# Patient Record
Sex: FEMALE | Race: WHITE | Employment: UNEMPLOYED | URBAN - METROPOLITAN AREA
[De-identification: names, ages, dates, MRNs, and addresses within clinical notes are randomized per-mention and may not be internally consistent; named-entity substitution may affect disease eponyms.]

---

## 2019-08-03 ENCOUNTER — HOSPITAL ENCOUNTER (EMERGENCY)
Facility: HOSPITAL | Age: 64
Discharge: HOME | End: 2019-08-03
Payer: COMMERCIAL

## 2019-08-03 ENCOUNTER — APPOINTMENT (OUTPATIENT)
Dept: GENERAL RADIOLOGY | Facility: HOSPITAL | Age: 64
End: 2019-08-03
Payer: COMMERCIAL

## 2019-08-03 VITALS
TEMPERATURE: 98 F | WEIGHT: 150 LBS | OXYGEN SATURATION: 100 % | DIASTOLIC BLOOD PRESSURE: 84 MMHG | HEART RATE: 60 BPM | SYSTOLIC BLOOD PRESSURE: 137 MMHG | RESPIRATION RATE: 18 BRPM

## 2019-08-03 DIAGNOSIS — S49.92XA INJURY OF LEFT SHOULDER, INITIAL ENCOUNTER: Primary | ICD-10-CM

## 2019-08-03 PROCEDURE — 73030 X-RAY EXAM OF SHOULDER: CPT

## 2019-08-03 PROCEDURE — 99283 EMERGENCY DEPT VISIT LOW MDM: CPT

## 2019-08-03 RX ORDER — IBUPROFEN 600 MG/1
600 TABLET ORAL EVERY 8 HOURS PRN
Qty: 30 TABLET | Refills: 0 | Status: SHIPPED | OUTPATIENT
Start: 2019-08-03 | End: 2019-08-10

## 2019-08-03 RX ORDER — LITHIUM CARBONATE 300 MG/1
300 CAPSULE ORAL DAILY
COMMUNITY

## 2019-08-03 NOTE — ED INITIAL ASSESSMENT (HPI)
Patient fell in her backyard while doing yard work. Patient tripped over gutter. No dizziness. Patient states she fell onto left side, presents with left shoulder pain. Patient stated when she got up her shoulder popped.  Patient able to move all of left ar

## 2019-08-03 NOTE — ED PROVIDER NOTES
Patient Seen in: [facility] AND [facility] Emergency Department    History   Patient presents with:  Upper Extremity Injury (musculoskeletal)    Stated Complaint: fall shoulder injury    64yo/f with no chronic medical problems reports to the ED with complaints o Neck: Normal range of motion. Neck supple. Cardiovascular: Normal rate, regular rhythm, normal heart sounds and intact distal pulses. Pulmonary/Chest: Effort normal and breath sounds normal.   Abdominal: Soft.  Bowel sounds are normal.   Musculoskelet Discharge Medication List    START taking these medications    ibuprofen 600 MG Oral Tab  Take 1 tablet (600 mg total) by mouth every 8 (eight) hours as needed for Pain or Fever.   Qty: 30 tablet Refills: 0

## 2025-03-21 ENCOUNTER — EKG ENCOUNTER (OUTPATIENT)
Dept: LAB | Facility: HOSPITAL | Age: 70
End: 2025-03-21
Attending: FAMILY MEDICINE
Payer: COMMERCIAL

## 2025-03-21 ENCOUNTER — OFFICE VISIT (OUTPATIENT)
Dept: FAMILY MEDICINE CLINIC | Facility: CLINIC | Age: 70
End: 2025-03-21
Payer: COMMERCIAL

## 2025-03-21 ENCOUNTER — TELEPHONE (OUTPATIENT)
Dept: FAMILY MEDICINE CLINIC | Facility: CLINIC | Age: 70
End: 2025-03-21

## 2025-03-21 VITALS
DIASTOLIC BLOOD PRESSURE: 84 MMHG | RESPIRATION RATE: 16 BRPM | HEIGHT: 62 IN | BODY MASS INDEX: 27.42 KG/M2 | WEIGHT: 149 LBS | HEART RATE: 58 BPM | OXYGEN SATURATION: 98 % | SYSTOLIC BLOOD PRESSURE: 134 MMHG

## 2025-03-21 DIAGNOSIS — Z87.898 HISTORY OF POSTOPERATIVE NAUSEA AND VOMITING: ICD-10-CM

## 2025-03-21 DIAGNOSIS — E78.2 MIXED HYPERLIPIDEMIA: ICD-10-CM

## 2025-03-21 DIAGNOSIS — Z01.818 PREOP EXAMINATION: ICD-10-CM

## 2025-03-21 DIAGNOSIS — M16.11 PRIMARY OSTEOARTHRITIS OF RIGHT HIP: Primary | ICD-10-CM

## 2025-03-21 DIAGNOSIS — M47.816 LUMBAR SPONDYLOSIS: ICD-10-CM

## 2025-03-21 DIAGNOSIS — N18.32 STAGE 3B CHRONIC KIDNEY DISEASE (HCC): ICD-10-CM

## 2025-03-21 DIAGNOSIS — Z01.812 PRE-OPERATIVE LABORATORY EXAMINATION: ICD-10-CM

## 2025-03-21 DIAGNOSIS — N32.81 OAB (OVERACTIVE BLADDER): ICD-10-CM

## 2025-03-21 DIAGNOSIS — F31.9 BIPOLAR 1 DISORDER (HCC): ICD-10-CM

## 2025-03-21 DIAGNOSIS — E27.8 ADRENAL CYST (HCC): ICD-10-CM

## 2025-03-21 DIAGNOSIS — N39.0 RECURRENT UTI: ICD-10-CM

## 2025-03-21 DIAGNOSIS — K21.00 GASTROESOPHAGEAL REFLUX DISEASE WITH ESOPHAGITIS WITHOUT HEMORRHAGE: ICD-10-CM

## 2025-03-21 LAB
ALBUMIN SERPL-MCNC: 4.4 G/DL (ref 3.2–4.8)
ALBUMIN/GLOB SERPL: 1.8 {RATIO} (ref 1–2)
ALP LIVER SERPL-CCNC: 50 U/L
ALT SERPL-CCNC: 16 U/L
ANION GAP SERPL CALC-SCNC: 6 MMOL/L (ref 0–18)
ANTIBODY SCREEN: NEGATIVE
AST SERPL-CCNC: 29 U/L (ref ?–34)
ATRIAL RATE: 50 BPM
BASOPHILS # BLD AUTO: 0.04 X10(3) UL (ref 0–0.2)
BASOPHILS NFR BLD AUTO: 0.5 %
BILIRUB SERPL-MCNC: 0.5 MG/DL (ref 0.2–1.1)
BILIRUB UR QL: NEGATIVE
BUN BLD-MCNC: 30 MG/DL (ref 9–23)
BUN/CREAT SERPL: 23.4 (ref 10–20)
CALCIUM BLD-MCNC: 9.5 MG/DL (ref 8.7–10.4)
CHLORIDE SERPL-SCNC: 106 MMOL/L (ref 98–112)
CLARITY UR: CLEAR
CO2 SERPL-SCNC: 28 MMOL/L (ref 21–32)
CREAT BLD-MCNC: 1.28 MG/DL
DEPRECATED RDW RBC AUTO: 47.9 FL (ref 35.1–46.3)
EGFRCR SERPLBLD CKD-EPI 2021: 45 ML/MIN/1.73M2 (ref 60–?)
EOSINOPHIL # BLD AUTO: 0.05 X10(3) UL (ref 0–0.7)
EOSINOPHIL NFR BLD AUTO: 0.6 %
ERYTHROCYTE [DISTWIDTH] IN BLOOD BY AUTOMATED COUNT: 13.9 % (ref 11–15)
FASTING STATUS PATIENT QL REPORTED: NO
GLOBULIN PLAS-MCNC: 2.5 G/DL (ref 2–3.5)
GLUCOSE BLD-MCNC: 70 MG/DL (ref 70–99)
GLUCOSE UR-MCNC: NORMAL MG/DL
HCT VFR BLD AUTO: 38.5 %
HGB BLD-MCNC: 12.9 G/DL
HGB UR QL STRIP.AUTO: NEGATIVE
IMM GRANULOCYTES # BLD AUTO: 0.02 X10(3) UL (ref 0–1)
IMM GRANULOCYTES NFR BLD: 0.2 %
KETONES UR-MCNC: NEGATIVE MG/DL
LEUKOCYTE ESTERASE UR QL STRIP.AUTO: 25
LYMPHOCYTES # BLD AUTO: 2.15 X10(3) UL (ref 1–4)
LYMPHOCYTES NFR BLD AUTO: 25.6 %
MCH RBC QN AUTO: 31.5 PG (ref 26–34)
MCHC RBC AUTO-ENTMCNC: 33.5 G/DL (ref 31–37)
MCV RBC AUTO: 94.1 FL
MONOCYTES # BLD AUTO: 0.75 X10(3) UL (ref 0.1–1)
MONOCYTES NFR BLD AUTO: 8.9 %
NEUTROPHILS # BLD AUTO: 5.38 X10 (3) UL (ref 1.5–7.7)
NEUTROPHILS # BLD AUTO: 5.38 X10(3) UL (ref 1.5–7.7)
NEUTROPHILS NFR BLD AUTO: 64.2 %
NITRITE UR QL STRIP.AUTO: NEGATIVE
OSMOLALITY SERPL CALC.SUM OF ELEC: 295 MOSM/KG (ref 275–295)
P AXIS: 38 DEGREES
P-R INTERVAL: 172 MS
PH UR: 7 [PH] (ref 5–8)
PLATELET # BLD AUTO: 236 10(3)UL (ref 150–450)
POTASSIUM SERPL-SCNC: 4.8 MMOL/L (ref 3.5–5.1)
PROT SERPL-MCNC: 6.9 G/DL (ref 5.7–8.2)
PROT UR-MCNC: NEGATIVE MG/DL
Q-T INTERVAL: 434 MS
QRS DURATION: 84 MS
QTC CALCULATION (BEZET): 395 MS
R AXIS: 6 DEGREES
RBC # BLD AUTO: 4.09 X10(6)UL
RH BLOOD TYPE: POSITIVE
SODIUM SERPL-SCNC: 140 MMOL/L (ref 136–145)
SP GR UR STRIP: 1.01 (ref 1–1.03)
T AXIS: 36 DEGREES
UROBILINOGEN UR STRIP-ACNC: NORMAL
VENTRICULAR RATE: 50 BPM
WBC # BLD AUTO: 8.4 X10(3) UL (ref 4–11)

## 2025-03-21 PROCEDURE — 93005 ELECTROCARDIOGRAM TRACING: CPT

## 2025-03-21 PROCEDURE — 87186 SC STD MICRODIL/AGAR DIL: CPT | Performed by: FAMILY MEDICINE

## 2025-03-21 PROCEDURE — 87081 CULTURE SCREEN ONLY: CPT | Performed by: FAMILY MEDICINE

## 2025-03-21 PROCEDURE — 81001 URINALYSIS AUTO W/SCOPE: CPT | Performed by: FAMILY MEDICINE

## 2025-03-21 PROCEDURE — 85025 COMPLETE CBC W/AUTO DIFF WBC: CPT | Performed by: FAMILY MEDICINE

## 2025-03-21 PROCEDURE — 80053 COMPREHEN METABOLIC PANEL: CPT | Performed by: FAMILY MEDICINE

## 2025-03-21 PROCEDURE — 87077 CULTURE AEROBIC IDENTIFY: CPT | Performed by: FAMILY MEDICINE

## 2025-03-21 PROCEDURE — 86901 BLOOD TYPING SEROLOGIC RH(D): CPT | Performed by: FAMILY MEDICINE

## 2025-03-21 PROCEDURE — 93010 ELECTROCARDIOGRAM REPORT: CPT | Performed by: INTERNAL MEDICINE

## 2025-03-21 PROCEDURE — 87086 URINE CULTURE/COLONY COUNT: CPT | Performed by: FAMILY MEDICINE

## 2025-03-21 PROCEDURE — 86850 RBC ANTIBODY SCREEN: CPT | Performed by: FAMILY MEDICINE

## 2025-03-21 PROCEDURE — 86900 BLOOD TYPING SEROLOGIC ABO: CPT | Performed by: FAMILY MEDICINE

## 2025-03-21 RX ORDER — MAGNESIUM AMINO ACID CHELATE 100 MG
TABLET ORAL DAILY
COMMUNITY

## 2025-03-21 RX ORDER — CELECOXIB 200 MG/1
200 CAPSULE ORAL DAILY
COMMUNITY

## 2025-03-21 RX ORDER — ESTRADIOL 10 UG/1
10 TABLET, FILM COATED VAGINAL
COMMUNITY

## 2025-03-21 RX ORDER — MELATONIN
1000 DAILY
COMMUNITY

## 2025-03-21 RX ORDER — NITROFURANTOIN 25; 75 MG/1; MG/1
100 CAPSULE ORAL ONCE AS NEEDED
COMMUNITY

## 2025-03-21 RX ORDER — ROSUVASTATIN CALCIUM 10 MG/1
10 TABLET, COATED ORAL NIGHTLY
COMMUNITY

## 2025-03-21 RX ORDER — ESTRADIOL 10 UG/1
TABLET, FILM COATED VAGINAL
COMMUNITY

## 2025-03-21 RX ORDER — ALPRAZOLAM 0.5 MG
0.25 TABLET ORAL NIGHTLY PRN
COMMUNITY

## 2025-03-21 NOTE — H&P (VIEW-ONLY)
[facility] PRE-OP CLINIC [place]    PRE-OP NOTE    HPI:   I have been consulted by Dr. Calloway to see Kerline Vizcaino 69 year old female for a preoperative evaluation and medical clearance. She  has a long history of worsening severe degenerative arthritis of her right hip . Patient is to have a right total hip arthroplasty  by Dr. Calloway  on 4/8/2025.     Pt suffers significant pain and loss of function.     She has no cardiopulmonary symptoms.      She has no history of MELVIN or DVT. Denies tobacco use.       Family History   Problem Relation Age of Onset    Other (aortic valve surgery) Mother     Bipolar Disorder Mother     Other (MI) Father     Other (osteoarthritis) Sister     Bipolar Disorder Maternal Grandmother       Current Outpatient Medications   Medication Sig Dispense Refill    rosuvastatin 10 MG Oral Tab Take 1 tablet (10 mg total) by mouth nightly.      Estradiol 10 MCG Vaginal Tab Place 10 mcg vaginally twice a week.      nitrofurantoin monohydrate macro (MACROBID) 100 MG Oral Cap Take 1 capsule (100 mg total) by mouth once as needed (as needed after intercorse).      celecoxib 200 MG Oral Cap Take 1 capsule (200 mg total) by mouth daily.      Glucosamine HCl-Glucosamin SO4 (GLUCOSAMINE COMPLEX OR) Take by mouth daily.      Estradiol (VAGIFEM) 10 MCG Vaginal Tab Place vaginally.      ALPRAZolam 0.5 MG Oral Tab Take 0.5 tablets (0.25 mg total) by mouth nightly as needed for Sleep.      Esomeprazole Magnesium 20 MG Oral Capsule Delayed Release Take 1 capsule (20 mg total) by mouth every morning before breakfast.      cyanocobalamin 1000 MCG Oral Tab Take 1 tablet (1,000 mcg total) by mouth daily.      Chelated Magnesium 100 MG Oral Tab Take by mouth daily.      Cholecalciferol (VITAMIN D3) 25 MCG (1000 UT) Oral Cap Take 1 tablet by mouth daily.      Lithium Carbonate 300 MG Oral Cap Take 1 capsule (300 mg total) by mouth daily.       Past Medical History:    Adrenal cyst (HCC)    Bipolar affective (HCC)     GERD (gastroesophageal reflux disease)    Hiatal hernia    7cm    HLD (hyperlipidemia)    Osteoarthritis    Osteopenia of lumbar spine    Overactive bladder    PONV (postoperative nausea and vomiting)    Stage 3 chronic kidney disease (HCC)    Visual impairment    reading and driving wears glasses    Zenker diverticulum     History reviewed. No pertinent surgical history.  Social History     Socioeconomic History    Marital status:      Spouse name: Not on file    Number of children: Not on file    Years of education: Not on file    Highest education level: Not on file   Occupational History    Not on file   Tobacco Use    Smoking status: Never     Passive exposure: Past    Smokeless tobacco: Never   Vaping Use    Vaping status: Never Used   Substance and Sexual Activity    Alcohol use: Yes     Comment: 3 drinks on weekends only    Drug use: Never    Sexual activity: Not on file   Other Topics Concern    Not on file   Social History Narrative    Not on file     Social Drivers of Health     Food Insecurity: No Food Insecurity (5/28/2024)    Received from [facility]    Hunger Vital Sign     Worried About Running Out of Food in the Last Year: Never true     Ran Out of Food in the Last Year: Never true   Transportation Needs: No Transportation Needs (5/28/2024)    Received from [facility]    PRAPARE - Transportation     Lack of Transportation (Medical): No     Lack of Transportation (Non-Medical): No   Stress: Not on file   Housing Stability: Low Risk  (5/28/2024)    Received from [facility]    Housing Stability Vital Sign     Unable to Pay for Housing in the Last Year: No     Number of Places Lived in the Last Year: 1     Unstable Housing in the Last Year: No       REVIEW OF SYSTEMS:   CONSTITUTIONAL:  Denies unusual weight gain/loss, fever, chills  EENT:  Eyes:  Denies eye pain, visual loss, blurred vision, double vision. Ears, Nose, Throat:   Denies congestion, runny nose or sore throat.  INTEGUMENTARY:  Denies rashes, itching, skin lesion,   CARDIOVASCULAR:  Denies DVT. Denies chest pain, palpitations, edema, dyspnea  RESPIRATORY: Denies  MELVIN ,Denies shortness of breath, wheezing, cough  GASTROINTESTINAL:  Denies abdominal pain, nausea, vomiting, constipation, diarrhea, or blood in stool.  MUSCULOSKELETAL: severe pain to her right hip as noted above  NEUROLOGICAL:  Denies headache, seizures, dizziness, syncope, paralysis, ataxia,  HEMATOLOGIC:  Denies anemia, bleeding or bruising.  LYMPHATICS:  Denies enlarged nodes   PSYCHIATRIC:  Denies depression or anxiety.  ENDOCRINOLOGIC: DM 2 NO,   ALLERGIES:  Denies allergic response, history of asthma, hives,     EXAM:   /84 (BP Location: Left arm)   Pulse 58   Resp 16   Ht 5' 2\" (1.575 m)   Wt 149 lb (67.6 kg)   SpO2 98%   BMI 27.25 kg/m²  Estimated body mass index is 27.25 kg/m² as calculated from the following:    Height as of this encounter: 5' 2\" (1.575 m).    Weight as of this encounter: 149 lb (67.6 kg).   Vital signs reviewed.Appears stated age, well groomed.  Physical Exam:  GEN:  Patient is alert, awake and oriented, well developed, well nourished, no apparent distress.  HEENT:  Head:  Normocephalic, atraumatic Nose: patent, no nasal discharge  NECK: Supple, no CLAD, no carotid bruit, no thyromegaly.  SKIN: No rashes, no skin lesion, no bruising, good turgor.  HEART:  Regular rate and rhythm, no murmurs, rubs or gallops.  LUNGS: Clear to auscultation bilterally, no rales/rhonchi/wheezing.  CHEST: No tenderness.  ABDOMEN:  Soft, nondistended, nontender,  no masses, no hepatosplenomegaly.  BACK: No tenderness, no spasm,   EXTREMITIES:  restricted ROM to her right hip,   NEURO:  No focal deficit, speech fluent, she walks with an antalgic  gait, strength and tone, sensory intact      No results found for: \"WBC\", \"HGB\", \"HCT\", \"PLT\", \"CREATSERUM\", \"BUN\", \"NA\", \"K\", \"CL\", \"CO2\", \"GLU\", \"CA\",  \"ALB\", \"ALKPHO\", \"BILT\", \"TP\", \"AST\", \"ALT\", \"PTT\", \"INR\", \"PT\", \"T4F\", \"TSH\", \"TSHREFLEX\", \"NEELAM\", \"LIP\", \"GGT\", \"PSA\", \"DDIMER\", \"ESRML\", \"ESRPF\", \"CRP\", \"BNP\", \"MG\", \"PHOS\", \"TROP\", \"CK\", \"CKMB\", \"MINDI\", \"RPR\", \"B12\", \"ETOH\", \"POCGLU\"    No results found.          ASSESSMENT AND PLAN:   Kerline Vizcaino is a 69 year old female, with a hx of severe degenerative arthritis of her right hip  who presents for a pre-operative physical exam. Patient is to have a right total hip arthroplasty  by Dr. Calloway  on 4/8/2025.      ICD-10-CM    1. Primary osteoarthritis of right hip  M16.11       2. Adrenal cyst ([facility])  E27.8       3. Bipolar 1 disorder ([facility])  F31.9       4. Gastroesophageal reflux disease with esophagitis without hemorrhage  K21.00       5. Recurrent UTI  N39.0       6. Mixed hyperlipidemia  E78.2       7. Lumbar spondylosis  M47.816       8. OAB (overactive bladder)  N32.81       9. History of postoperative nausea and vomiting  Z87.898       10. Stage 3b chronic kidney disease (HCC)  N18.32       11. Pre-operative laboratory examination  Z01.812 CBC With Differential With Platelet     Comp Metabolic Panel (14)     EKG 12 Lead     MSSA and MRSA Culture Screen     Type and screen     Urinalysis with Culture Reflex      12. Preop examination  Z01.818 CBC With Differential With Platelet     Comp Metabolic Panel (14)     EKG 12 Lead     MSSA and MRSA Culture Screen     Type and screen     Urinalysis with Culture Reflex           ECG and labs are pending review     Preoperative Risk Stratification: There are no decompensated medical conditions. ASA classification 2    Medical history:  High risk surgery (vascular, thoracic, intra-peritoneal): No  CAD: No  CHF: No  Stroke: No  DM on insulin: No  Serum Creatinine >2 mg/dl: No  Patient has an RCRI score that is intermediate risk and is at intermediate risk for major cardiac event in the perioperative period.     Patient is medically optimized and has an acceptable risk of  surgery and may proceed with surgery as planned.     PLAN:    Patient to discontinue medications and supplements with anticoagulation properties as per instruction.        Postoperative Recommendations:    Consider trial of postoperative pain management with non-narcotic analgesia if possible due to her adverse reactions to narcotics with her Psychiatric condition and interaction with Lithium.     Anticoagulation / DVT prophylaxis: SCDs, early ambulation.  mg twice daily with food for four weeks.    GI protection: Protonix  Incentive Spirometry   Telemetry as needed  CPAP/O2 as needed   DM: QID glucoscans and sliding scale insulin as needed   Renal protection: (hydration / NSAID and ACE/ARB avoidance)   Cognitive protection: (minimize narcotics, benzodiazepines, scopolamine)     Pain management and Physical therapy as per Orthopedic service.   Home Health as needed    Thank you for the opportunity to care for your patient and to assist in managing the postoperative course.        Casimiro Rueda DO  3/21/2025  10:26 AM

## 2025-03-21 NOTE — H&P
[facility] PRE-OP CLINIC [place]    PRE-OP NOTE    HPI:   I have been consulted by Dr. Calloway to see Kerline Vizcaino 69 year old female for a preoperative evaluation and medical clearance. She  has a long history of worsening severe degenerative arthritis of her right hip . Patient is to have a right total hip arthroplasty  by Dr. Calloway  on 4/8/2025.     Pt suffers significant pain and loss of function.     She has no cardiopulmonary symptoms.      She has no history of MELVIN or DVT. Denies tobacco use.       Family History   Problem Relation Age of Onset    Other (aortic valve surgery) Mother     Bipolar Disorder Mother     Other (MI) Father     Other (osteoarthritis) Sister     Bipolar Disorder Maternal Grandmother       Current Outpatient Medications   Medication Sig Dispense Refill    rosuvastatin 10 MG Oral Tab Take 1 tablet (10 mg total) by mouth nightly.      Estradiol 10 MCG Vaginal Tab Place 10 mcg vaginally twice a week.      nitrofurantoin monohydrate macro (MACROBID) 100 MG Oral Cap Take 1 capsule (100 mg total) by mouth once as needed (as needed after intercorse).      celecoxib 200 MG Oral Cap Take 1 capsule (200 mg total) by mouth daily.      Glucosamine HCl-Glucosamin SO4 (GLUCOSAMINE COMPLEX OR) Take by mouth daily.      Estradiol (VAGIFEM) 10 MCG Vaginal Tab Place vaginally.      ALPRAZolam 0.5 MG Oral Tab Take 0.5 tablets (0.25 mg total) by mouth nightly as needed for Sleep.      Esomeprazole Magnesium 20 MG Oral Capsule Delayed Release Take 1 capsule (20 mg total) by mouth every morning before breakfast.      cyanocobalamin 1000 MCG Oral Tab Take 1 tablet (1,000 mcg total) by mouth daily.      Chelated Magnesium 100 MG Oral Tab Take by mouth daily.      Cholecalciferol (VITAMIN D3) 25 MCG (1000 UT) Oral Cap Take 1 tablet by mouth daily.      Lithium Carbonate 300 MG Oral Cap Take 1 capsule (300 mg total) by mouth daily.       Past Medical History:    Adrenal cyst (HCC)    Bipolar affective (HCC)     GERD (gastroesophageal reflux disease)    Hiatal hernia    7cm    HLD (hyperlipidemia)    Osteoarthritis    Osteopenia of lumbar spine    Overactive bladder    PONV (postoperative nausea and vomiting)    Stage 3 chronic kidney disease (HCC)    Visual impairment    reading and driving wears glasses    Zenker diverticulum     History reviewed. No pertinent surgical history.  Social History     Socioeconomic History    Marital status:      Spouse name: Not on file    Number of children: Not on file    Years of education: Not on file    Highest education level: Not on file   Occupational History    Not on file   Tobacco Use    Smoking status: Never     Passive exposure: Past    Smokeless tobacco: Never   Vaping Use    Vaping status: Never Used   Substance and Sexual Activity    Alcohol use: Yes     Comment: 3 drinks on weekends only    Drug use: Never    Sexual activity: Not on file   Other Topics Concern    Not on file   Social History Narrative    Not on file     Social Drivers of Health     Food Insecurity: No Food Insecurity (5/28/2024)    Received from [facility]    Hunger Vital Sign     Worried About Running Out of Food in the Last Year: Never true     Ran Out of Food in the Last Year: Never true   Transportation Needs: No Transportation Needs (5/28/2024)    Received from [facility]    PRAPARE - Transportation     Lack of Transportation (Medical): No     Lack of Transportation (Non-Medical): No   Stress: Not on file   Housing Stability: Low Risk  (5/28/2024)    Received from [facility]    Housing Stability Vital Sign     Unable to Pay for Housing in the Last Year: No     Number of Places Lived in the Last Year: 1     Unstable Housing in the Last Year: No       REVIEW OF SYSTEMS:   CONSTITUTIONAL:  Denies unusual weight gain/loss, fever, chills  EENT:  Eyes:  Denies eye pain, visual loss, blurred vision, double vision. Ears, Nose, Throat:   Denies congestion, runny nose or sore throat.  INTEGUMENTARY:  Denies rashes, itching, skin lesion,   CARDIOVASCULAR:  Denies DVT. Denies chest pain, palpitations, edema, dyspnea  RESPIRATORY: Denies  MELVIN ,Denies shortness of breath, wheezing, cough  GASTROINTESTINAL:  Denies abdominal pain, nausea, vomiting, constipation, diarrhea, or blood in stool.  MUSCULOSKELETAL: severe pain to her right hip as noted above  NEUROLOGICAL:  Denies headache, seizures, dizziness, syncope, paralysis, ataxia,  HEMATOLOGIC:  Denies anemia, bleeding or bruising.  LYMPHATICS:  Denies enlarged nodes   PSYCHIATRIC:  Denies depression or anxiety.  ENDOCRINOLOGIC: DM 2 NO,   ALLERGIES:  Denies allergic response, history of asthma, hives,     EXAM:   /84 (BP Location: Left arm)   Pulse 58   Resp 16   Ht 5' 2\" (1.575 m)   Wt 149 lb (67.6 kg)   SpO2 98%   BMI 27.25 kg/m²  Estimated body mass index is 27.25 kg/m² as calculated from the following:    Height as of this encounter: 5' 2\" (1.575 m).    Weight as of this encounter: 149 lb (67.6 kg).   Vital signs reviewed.Appears stated age, well groomed.  Physical Exam:  GEN:  Patient is alert, awake and oriented, well developed, well nourished, no apparent distress.  HEENT:  Head:  Normocephalic, atraumatic Nose: patent, no nasal discharge  NECK: Supple, no CLAD, no carotid bruit, no thyromegaly.  SKIN: No rashes, no skin lesion, no bruising, good turgor.  HEART:  Regular rate and rhythm, no murmurs, rubs or gallops.  LUNGS: Clear to auscultation bilterally, no rales/rhonchi/wheezing.  CHEST: No tenderness.  ABDOMEN:  Soft, nondistended, nontender,  no masses, no hepatosplenomegaly.  BACK: No tenderness, no spasm,   EXTREMITIES:  restricted ROM to her right hip,   NEURO:  No focal deficit, speech fluent, she walks with an antalgic  gait, strength and tone, sensory intact      No results found for: \"WBC\", \"HGB\", \"HCT\", \"PLT\", \"CREATSERUM\", \"BUN\", \"NA\", \"K\", \"CL\", \"CO2\", \"GLU\", \"CA\",  \"ALB\", \"ALKPHO\", \"BILT\", \"TP\", \"AST\", \"ALT\", \"PTT\", \"INR\", \"PT\", \"T4F\", \"TSH\", \"TSHREFLEX\", \"NEELAM\", \"LIP\", \"GGT\", \"PSA\", \"DDIMER\", \"ESRML\", \"ESRPF\", \"CRP\", \"BNP\", \"MG\", \"PHOS\", \"TROP\", \"CK\", \"CKMB\", \"MINDI\", \"RPR\", \"B12\", \"ETOH\", \"POCGLU\"    No results found.          ASSESSMENT AND PLAN:   Kerline Vizcaino is a 69 year old female, with a hx of severe degenerative arthritis of her right hip  who presents for a pre-operative physical exam. Patient is to have a right total hip arthroplasty  by Dr. Calloway  on 4/8/2025.      ICD-10-CM    1. Primary osteoarthritis of right hip  M16.11       2. Adrenal cyst ([facility])  E27.8       3. Bipolar 1 disorder ([facility])  F31.9       4. Gastroesophageal reflux disease with esophagitis without hemorrhage  K21.00       5. Recurrent UTI  N39.0       6. Mixed hyperlipidemia  E78.2       7. Lumbar spondylosis  M47.816       8. OAB (overactive bladder)  N32.81       9. History of postoperative nausea and vomiting  Z87.898       10. Stage 3b chronic kidney disease (HCC)  N18.32       11. Pre-operative laboratory examination  Z01.812 CBC With Differential With Platelet     Comp Metabolic Panel (14)     EKG 12 Lead     MSSA and MRSA Culture Screen     Type and screen     Urinalysis with Culture Reflex      12. Preop examination  Z01.818 CBC With Differential With Platelet     Comp Metabolic Panel (14)     EKG 12 Lead     MSSA and MRSA Culture Screen     Type and screen     Urinalysis with Culture Reflex           ECG and labs are pending review     Preoperative Risk Stratification: There are no decompensated medical conditions. ASA classification 2    Medical history:  High risk surgery (vascular, thoracic, intra-peritoneal): No  CAD: No  CHF: No  Stroke: No  DM on insulin: No  Serum Creatinine >2 mg/dl: No  Patient has an RCRI score that is intermediate risk and is at intermediate risk for major cardiac event in the perioperative period.     Patient is medically optimized and has an acceptable risk of  surgery and may proceed with surgery as planned.     PLAN:    Patient to discontinue medications and supplements with anticoagulation properties as per instruction.        Postoperative Recommendations:    Consider trial of postoperative pain management with non-narcotic analgesia if possible due to her adverse reactions to narcotics with her Psychiatric condition and interaction with Lithium.     Anticoagulation / DVT prophylaxis: SCDs, early ambulation.  mg twice daily with food for four weeks.    GI protection: Protonix  Incentive Spirometry   Telemetry as needed  CPAP/O2 as needed   DM: QID glucoscans and sliding scale insulin as needed   Renal protection: (hydration / NSAID and ACE/ARB avoidance)   Cognitive protection: (minimize narcotics, benzodiazepines, scopolamine)     Pain management and Physical therapy as per Orthopedic service.   Home Health as needed    Thank you for the opportunity to care for your patient and to assist in managing the postoperative course.        Casimiro Rueda DO  3/21/2025  10:26 AM

## 2025-03-21 NOTE — TELEPHONE ENCOUNTER
RTHA on 04/08/25 with Dr. Calloway @ [facility]    H&P- completed 03/21/25  Labs- RDW-SD (47.9), BUN (30), Creatinine (1.28), BUN/Creat ratio (23.4), GFR (45), Alk Phos (50), UC 50-90,000 CFU/ML Klebsiella Pneumonia, MRSA neg, all other labs WNL  EKG- sinus bradycardia, otherwise WNL    Per NARENDRA-   Consider trial of postoperative pain management with non-narcotic analgesia if possible due to her adverse reactions to narcotics with her Psychiatric condition and interaction with Lithium.

## 2025-03-23 NOTE — TELEPHONE ENCOUNTER
Dr. Rueda, please review:    Labs- RDW-SD (47.9), BUN (30), Creatinine (1.28), BUN/Creat ratio (23.4), GFR (45), Alk Phos (50), UC 50-90,000 CFU/ML Klebsiella Pneumonia, MRSA neg, all other labs WNL    EKG- sinus bradycardia, otherwise WNL      URINE CULTURE 50,000-99,000 CFU/ML Klebsiella pneumoniae Abnormal         Resulting Agency: [facility] ([facility])     Susceptibility     Klebsiella pneumoniae     Not Specified    Ampicillin  Resistant    Ampicillin + Sulbactam 4 Sensitive    Cefazolin <=4 Sensitive    Ciprofloxacin <=0.25 Sensitive    Gentamicin <=1 Sensitive    Levofloxacin <=0.12 Sensitive    Meropenem <=0.25 Sensitive    Nitrofurantoin 128 Resistant    Piperacillin + Tazobactam <=4 Sensitive    Trimethoprim/Sulfa <=20 Sensitive                What antibiotic do you want to give? Only allergy is codeine.    OK for surgery?

## 2025-03-25 RX ORDER — CEPHALEXIN 500 MG/1
500 CAPSULE ORAL 3 TIMES DAILY
Qty: 15 CAPSULE | Refills: 0 | Status: SHIPPED | OUTPATIENT
Start: 2025-03-25 | End: 2025-03-30

## 2025-03-25 NOTE — TELEPHONE ENCOUNTER
Spoke to patient, went over all test results and let her know she is clear for surgery per Dr. Rueda. She will start Keflex 500mg TID x5 days. Rx sent to pharmacy.

## 2025-04-04 NOTE — CM/SW NOTE
SW was notified that the pt. Has been preoperatively arranged with [facility]. Referral to be sent post operatively via [facility].    Order and face to face to be sent via [facility] when entered by APN.      SARABJIT Rizzo ext [phone]

## 2025-04-07 RX ORDER — TRANEXAMIC ACID 10 MG/ML
1000 INJECTION, SOLUTION INTRAVENOUS ONCE
Status: CANCELLED | OUTPATIENT
Start: 2025-04-08 | End: 2025-04-08

## 2025-04-08 ENCOUNTER — APPOINTMENT (OUTPATIENT)
Dept: GENERAL RADIOLOGY | Facility: HOSPITAL | Age: 70
End: 2025-04-08
Attending: GENERAL PRACTICE
Payer: COMMERCIAL

## 2025-04-08 ENCOUNTER — ANESTHESIA EVENT (OUTPATIENT)
Dept: SURGERY | Facility: HOSPITAL | Age: 70
End: 2025-04-08
Payer: COMMERCIAL

## 2025-04-08 ENCOUNTER — HOSPITAL ENCOUNTER (INPATIENT)
Facility: HOSPITAL | Age: 70
LOS: 3 days | Discharge: HOME HEALTH | End: 2025-04-14
Attending: ORTHOPAEDIC SURGERY | Admitting: ORTHOPAEDIC SURGERY
Payer: COMMERCIAL

## 2025-04-08 ENCOUNTER — ANESTHESIA (OUTPATIENT)
Dept: SURGERY | Facility: HOSPITAL | Age: 70
End: 2025-04-08
Payer: COMMERCIAL

## 2025-04-08 DIAGNOSIS — Z96.641 PRESENCE OF ARTIFICIAL HIP, RIGHT: Primary | ICD-10-CM

## 2025-04-08 PROBLEM — E78.5 HYPERLIPIDEMIA: Status: ACTIVE | Noted: 2025-04-08

## 2025-04-08 PROBLEM — N18.30 CKD (CHRONIC KIDNEY DISEASE) STAGE 3, GFR 30-59 ML/MIN (HCC): Status: ACTIVE | Noted: 2025-04-08

## 2025-04-08 PROBLEM — M16.11 PRIMARY OSTEOARTHRITIS OF RIGHT HIP: Status: ACTIVE | Noted: 2025-04-08

## 2025-04-08 PROBLEM — F31.9 BIPOLAR AFFECTIVE DISORDER (HCC): Status: ACTIVE | Noted: 2025-04-08

## 2025-04-08 LAB — RH BLOOD TYPE: POSITIVE

## 2025-04-08 PROCEDURE — 0SR90JA REPLACEMENT OF RIGHT HIP JOINT WITH SYNTHETIC SUBSTITUTE, UNCEMENTED, OPEN APPROACH: ICD-10-PCS | Performed by: ORTHOPAEDIC SURGERY

## 2025-04-08 PROCEDURE — 99203 OFFICE O/P NEW LOW 30 MIN: CPT | Performed by: HOSPITALIST

## 2025-04-08 PROCEDURE — 73501 X-RAY EXAM HIP UNI 1 VIEW: CPT | Performed by: GENERAL PRACTICE

## 2025-04-08 DEVICE — IMPLANTABLE DEVICE
Type: IMPLANTABLE DEVICE | Site: HIP | Status: FUNCTIONAL
Brand: G7® VIVACIT-E®

## 2025-04-08 DEVICE — IMPLANTABLE DEVICE
Type: IMPLANTABLE DEVICE | Site: HIP | Status: FUNCTIONAL
Brand: AVENIR COMPLETE™

## 2025-04-08 DEVICE — IMPLANTABLE DEVICE: Type: IMPLANTABLE DEVICE | Site: HIP | Status: FUNCTIONAL

## 2025-04-08 DEVICE — BIOLOX® DELTA, CERAMIC FEMORAL HEAD, L, Ø 36/+3.5, TAPER 12/14
Type: IMPLANTABLE DEVICE | Site: HIP | Status: FUNCTIONAL
Brand: BIOLOX® DELTA

## 2025-04-08 DEVICE — IMPLANTABLE DEVICE
Type: IMPLANTABLE DEVICE | Site: HIP | Status: FUNCTIONAL
Brand: G7® ACETABULAR SYSTEM

## 2025-04-08 RX ORDER — SENNOSIDES 8.6 MG
325 CAPSULE ORAL 2 TIMES DAILY
Status: DISCONTINUED | OUTPATIENT
Start: 2025-04-08 | End: 2025-04-14

## 2025-04-08 RX ORDER — ACETAMINOPHEN 500 MG
1000 TABLET ORAL ONCE
Status: COMPLETED | OUTPATIENT
Start: 2025-04-08 | End: 2025-04-08

## 2025-04-08 RX ORDER — ROSUVASTATIN CALCIUM 10 MG/1
10 TABLET, COATED ORAL EVERY MORNING
Status: DISCONTINUED | OUTPATIENT
Start: 2025-04-09 | End: 2025-04-14

## 2025-04-08 RX ORDER — PANTOPRAZOLE SODIUM 40 MG/1
40 TABLET, DELAYED RELEASE ORAL
Status: DISCONTINUED | OUTPATIENT
Start: 2025-04-09 | End: 2025-04-14

## 2025-04-08 RX ORDER — LITHIUM CARBONATE 300 MG/1
300 TABLET, FILM COATED, EXTENDED RELEASE ORAL EVERY MORNING
Status: DISCONTINUED | OUTPATIENT
Start: 2025-04-09 | End: 2025-04-14

## 2025-04-08 RX ORDER — HYDROMORPHONE HYDROCHLORIDE 1 MG/ML
0.6 INJECTION, SOLUTION INTRAMUSCULAR; INTRAVENOUS; SUBCUTANEOUS EVERY 5 MIN PRN
Status: DISCONTINUED | OUTPATIENT
Start: 2025-04-08 | End: 2025-04-08 | Stop reason: HOSPADM

## 2025-04-08 RX ORDER — DOCUSATE SODIUM 100 MG/1
100 CAPSULE, LIQUID FILLED ORAL 2 TIMES DAILY
Status: DISCONTINUED | OUTPATIENT
Start: 2025-04-08 | End: 2025-04-14

## 2025-04-08 RX ORDER — TRANEXAMIC ACID 10 MG/ML
INJECTION, SOLUTION INTRAVENOUS AS NEEDED
Status: DISCONTINUED | OUTPATIENT
Start: 2025-04-08 | End: 2025-04-08 | Stop reason: SURG

## 2025-04-08 RX ORDER — TRANEXAMIC ACID 10 MG/ML
1000 INJECTION, SOLUTION INTRAVENOUS ONCE
Status: DISCONTINUED | OUTPATIENT
Start: 2025-04-08 | End: 2025-04-08

## 2025-04-08 RX ORDER — POLYETHYLENE GLYCOL 3350 17 G/17G
17 POWDER, FOR SOLUTION ORAL DAILY PRN
Status: DISCONTINUED | OUTPATIENT
Start: 2025-04-08 | End: 2025-04-14

## 2025-04-08 RX ORDER — HYDROMORPHONE HYDROCHLORIDE 1 MG/ML
0.4 INJECTION, SOLUTION INTRAMUSCULAR; INTRAVENOUS; SUBCUTANEOUS EVERY 2 HOUR PRN
Status: DISCONTINUED | OUTPATIENT
Start: 2025-04-08 | End: 2025-04-14

## 2025-04-08 RX ORDER — TRAMADOL HYDROCHLORIDE 50 MG/1
50 TABLET ORAL EVERY 6 HOURS SCHEDULED
Status: DISCONTINUED | OUTPATIENT
Start: 2025-04-08 | End: 2025-04-14

## 2025-04-08 RX ORDER — HYDROMORPHONE HYDROCHLORIDE 1 MG/ML
0.2 INJECTION, SOLUTION INTRAMUSCULAR; INTRAVENOUS; SUBCUTANEOUS EVERY 2 HOUR PRN
Status: DISCONTINUED | OUTPATIENT
Start: 2025-04-08 | End: 2025-04-14

## 2025-04-08 RX ORDER — CELECOXIB 200 MG/1
200 CAPSULE ORAL ONCE
Status: COMPLETED | OUTPATIENT
Start: 2025-04-08 | End: 2025-04-08

## 2025-04-08 RX ORDER — LIDOCAINE HYDROCHLORIDE 10 MG/ML
INJECTION, SOLUTION EPIDURAL; INFILTRATION; INTRACAUDAL; PERINEURAL AS NEEDED
Status: DISCONTINUED | OUTPATIENT
Start: 2025-04-08 | End: 2025-04-08 | Stop reason: SURG

## 2025-04-08 RX ORDER — DEXAMETHASONE SODIUM PHOSPHATE 4 MG/ML
VIAL (ML) INJECTION AS NEEDED
Status: DISCONTINUED | OUTPATIENT
Start: 2025-04-08 | End: 2025-04-08 | Stop reason: SURG

## 2025-04-08 RX ORDER — ALPRAZOLAM 0.25 MG
0.25 TABLET ORAL NIGHTLY PRN
Status: DISCONTINUED | OUTPATIENT
Start: 2025-04-08 | End: 2025-04-14

## 2025-04-08 RX ORDER — BISACODYL 10 MG
10 SUPPOSITORY, RECTAL RECTAL
Status: DISCONTINUED | OUTPATIENT
Start: 2025-04-08 | End: 2025-04-14

## 2025-04-08 RX ORDER — HYDROMORPHONE HYDROCHLORIDE 1 MG/ML
0.2 INJECTION, SOLUTION INTRAMUSCULAR; INTRAVENOUS; SUBCUTANEOUS EVERY 5 MIN PRN
Status: DISCONTINUED | OUTPATIENT
Start: 2025-04-08 | End: 2025-04-08 | Stop reason: HOSPADM

## 2025-04-08 RX ORDER — ONDANSETRON 2 MG/ML
4 INJECTION INTRAMUSCULAR; INTRAVENOUS EVERY 6 HOURS PRN
Status: DISCONTINUED | OUTPATIENT
Start: 2025-04-08 | End: 2025-04-14

## 2025-04-08 RX ORDER — ONDANSETRON 2 MG/ML
4 INJECTION INTRAMUSCULAR; INTRAVENOUS EVERY 6 HOURS PRN
Status: DISCONTINUED | OUTPATIENT
Start: 2025-04-08 | End: 2025-04-08

## 2025-04-08 RX ORDER — OXYCODONE HYDROCHLORIDE 5 MG/1
2.5 TABLET ORAL EVERY 4 HOURS PRN
Status: DISCONTINUED | OUTPATIENT
Start: 2025-04-08 | End: 2025-04-14

## 2025-04-08 RX ORDER — HYDROMORPHONE HYDROCHLORIDE 1 MG/ML
0.4 INJECTION, SOLUTION INTRAMUSCULAR; INTRAVENOUS; SUBCUTANEOUS EVERY 5 MIN PRN
Status: DISCONTINUED | OUTPATIENT
Start: 2025-04-08 | End: 2025-04-08 | Stop reason: HOSPADM

## 2025-04-08 RX ORDER — GLYCOPYRROLATE 0.2 MG/ML
INJECTION, SOLUTION INTRAMUSCULAR; INTRAVENOUS AS NEEDED
Status: DISCONTINUED | OUTPATIENT
Start: 2025-04-08 | End: 2025-04-08 | Stop reason: SURG

## 2025-04-08 RX ORDER — ONDANSETRON 2 MG/ML
INJECTION INTRAMUSCULAR; INTRAVENOUS AS NEEDED
Status: DISCONTINUED | OUTPATIENT
Start: 2025-04-08 | End: 2025-04-08 | Stop reason: SURG

## 2025-04-08 RX ORDER — DIPHENHYDRAMINE HCL 25 MG
25 CAPSULE ORAL EVERY 4 HOURS PRN
Status: DISCONTINUED | OUTPATIENT
Start: 2025-04-08 | End: 2025-04-14

## 2025-04-08 RX ORDER — DIPHENHYDRAMINE HYDROCHLORIDE 50 MG/ML
12.5 INJECTION, SOLUTION INTRAMUSCULAR; INTRAVENOUS EVERY 4 HOURS PRN
Status: DISCONTINUED | OUTPATIENT
Start: 2025-04-08 | End: 2025-04-14

## 2025-04-08 RX ORDER — LIDOCAINE HYDROCHLORIDE 10 MG/ML
INJECTION, SOLUTION INFILTRATION; PERINEURAL
Status: COMPLETED | OUTPATIENT
Start: 2025-04-08 | End: 2025-04-08

## 2025-04-08 RX ORDER — MIDAZOLAM HYDROCHLORIDE 1 MG/ML
INJECTION INTRAMUSCULAR; INTRAVENOUS AS NEEDED
Status: DISCONTINUED | OUTPATIENT
Start: 2025-04-08 | End: 2025-04-08 | Stop reason: SURG

## 2025-04-08 RX ORDER — SODIUM CHLORIDE, SODIUM LACTATE, POTASSIUM CHLORIDE, CALCIUM CHLORIDE 600; 310; 30; 20 MG/100ML; MG/100ML; MG/100ML; MG/100ML
INJECTION, SOLUTION INTRAVENOUS CONTINUOUS
Status: DISCONTINUED | OUTPATIENT
Start: 2025-04-08 | End: 2025-04-09

## 2025-04-08 RX ORDER — SODIUM CHLORIDE, SODIUM LACTATE, POTASSIUM CHLORIDE, CALCIUM CHLORIDE 600; 310; 30; 20 MG/100ML; MG/100ML; MG/100ML; MG/100ML
INJECTION, SOLUTION INTRAVENOUS CONTINUOUS
Status: DISCONTINUED | OUTPATIENT
Start: 2025-04-08 | End: 2025-04-08 | Stop reason: HOSPADM

## 2025-04-08 RX ORDER — HYDROMORPHONE HYDROCHLORIDE 1 MG/ML
INJECTION, SOLUTION INTRAMUSCULAR; INTRAVENOUS; SUBCUTANEOUS AS NEEDED
Status: DISCONTINUED | OUTPATIENT
Start: 2025-04-08 | End: 2025-04-08 | Stop reason: SURG

## 2025-04-08 RX ORDER — SENNOSIDES 8.6 MG
17.2 TABLET ORAL NIGHTLY
Status: DISCONTINUED | OUTPATIENT
Start: 2025-04-08 | End: 2025-04-14

## 2025-04-08 RX ORDER — OXYCODONE HYDROCHLORIDE 5 MG/1
5 TABLET ORAL EVERY 4 HOURS PRN
Status: DISCONTINUED | OUTPATIENT
Start: 2025-04-08 | End: 2025-04-14

## 2025-04-08 RX ORDER — OXYCODONE HCL 10 MG/1
10 TABLET, FILM COATED, EXTENDED RELEASE ORAL ONCE
Status: COMPLETED | OUTPATIENT
Start: 2025-04-08 | End: 2025-04-08

## 2025-04-08 RX ORDER — DIPHENHYDRAMINE HYDROCHLORIDE 50 MG/ML
25 INJECTION, SOLUTION INTRAMUSCULAR; INTRAVENOUS ONCE AS NEEDED
Status: ACTIVE | OUTPATIENT
Start: 2025-04-08 | End: 2025-04-08

## 2025-04-08 RX ORDER — SODIUM PHOSPHATE, DIBASIC AND SODIUM PHOSPHATE, MONOBASIC 7; 19 G/230ML; G/230ML
1 ENEMA RECTAL ONCE AS NEEDED
Status: DISCONTINUED | OUTPATIENT
Start: 2025-04-08 | End: 2025-04-14

## 2025-04-08 RX ORDER — NALOXONE HYDROCHLORIDE 0.4 MG/ML
0.08 INJECTION, SOLUTION INTRAMUSCULAR; INTRAVENOUS; SUBCUTANEOUS AS NEEDED
Status: DISCONTINUED | OUTPATIENT
Start: 2025-04-08 | End: 2025-04-08 | Stop reason: HOSPADM

## 2025-04-08 RX ORDER — METOCLOPRAMIDE HYDROCHLORIDE 5 MG/ML
10 INJECTION INTRAMUSCULAR; INTRAVENOUS EVERY 8 HOURS PRN
Status: DISCONTINUED | OUTPATIENT
Start: 2025-04-08 | End: 2025-04-14

## 2025-04-08 RX ORDER — BUPIVACAINE HYDROCHLORIDE 7.5 MG/ML
INJECTION, SOLUTION INTRASPINAL
Status: COMPLETED | OUTPATIENT
Start: 2025-04-08 | End: 2025-04-08

## 2025-04-08 RX ORDER — ACETAMINOPHEN 325 MG/1
650 TABLET ORAL
Status: DISCONTINUED | OUTPATIENT
Start: 2025-04-08 | End: 2025-04-14

## 2025-04-08 RX ADMIN — DEXAMETHASONE SODIUM PHOSPHATE 4 MG: 4 MG/ML VIAL (ML) INJECTION at 10:57:00

## 2025-04-08 RX ADMIN — HYDROMORPHONE HYDROCHLORIDE 0.5 MG: 1 INJECTION, SOLUTION INTRAMUSCULAR; INTRAVENOUS; SUBCUTANEOUS at 11:19:00

## 2025-04-08 RX ADMIN — TRANEXAMIC ACID 1000 MG: 10 INJECTION, SOLUTION INTRAVENOUS at 12:20:00

## 2025-04-08 RX ADMIN — BUPIVACAINE HYDROCHLORIDE 1.5 ML: 7.5 INJECTION, SOLUTION INTRASPINAL at 10:15:00

## 2025-04-08 RX ADMIN — TRANEXAMIC ACID 1000 MG: 10 INJECTION, SOLUTION INTRAVENOUS at 10:35:00

## 2025-04-08 RX ADMIN — ONDANSETRON 4 MG: 2 INJECTION INTRAMUSCULAR; INTRAVENOUS at 10:57:00

## 2025-04-08 RX ADMIN — LIDOCAINE HYDROCHLORIDE 50 MG: 10 INJECTION, SOLUTION EPIDURAL; INFILTRATION; INTRACAUDAL; PERINEURAL at 10:10:00

## 2025-04-08 RX ADMIN — GLYCOPYRROLATE 0.2 MG: 0.2 INJECTION, SOLUTION INTRAMUSCULAR; INTRAVENOUS at 10:24:00

## 2025-04-08 RX ADMIN — LIDOCAINE HYDROCHLORIDE 2 ML: 10 INJECTION, SOLUTION INFILTRATION; PERINEURAL at 10:15:00

## 2025-04-08 RX ADMIN — GLYCOPYRROLATE 0.2 MG: 0.2 INJECTION, SOLUTION INTRAMUSCULAR; INTRAVENOUS at 11:19:00

## 2025-04-08 RX ADMIN — SODIUM CHLORIDE, SODIUM LACTATE, POTASSIUM CHLORIDE, CALCIUM CHLORIDE: 600; 310; 30; 20 INJECTION, SOLUTION INTRAVENOUS at 12:25:00

## 2025-04-08 RX ADMIN — SODIUM CHLORIDE, SODIUM LACTATE, POTASSIUM CHLORIDE, CALCIUM CHLORIDE: 600; 310; 30; 20 INJECTION, SOLUTION INTRAVENOUS at 10:10:00

## 2025-04-08 RX ADMIN — MIDAZOLAM HYDROCHLORIDE 2 MG: 1 INJECTION INTRAMUSCULAR; INTRAVENOUS at 10:10:00

## 2025-04-08 NOTE — ANESTHESIA PROCEDURE NOTES
Airway  Date/Time: 4/8/2025 10:51 AM  Urgency: elective      General Information and Staff    Patient location during procedure: OR  Anesthesiologist: Fab Macias MD  Performed: anesthesiologist   Performed by: Fab Macias MD  Authorized by: Fab Macias MD      Indications and Patient Condition  Indications for airway management: anesthesia  Sedation level: deep  Preoxygenated: yes  Patient position: sniffing  Mask difficulty assessment: 1 - vent by mask    Final Airway Details  Final airway type: supraglottic airway      Successful airway: classic  Size 4       Number of attempts at approach: 1    Additional Comments  Atraumatic x1

## 2025-04-08 NOTE — ANESTHESIA POSTPROCEDURE EVALUATION
Patient: Kerline Vizcaino    Procedure Summary       Date: 04/08/25 Room / Location: [facility] MAIN OR  / [facility] MAIN OR    Anesthesia Start: 1010 Anesthesia Stop: 1242    Procedure: Right total hip arthroplasty (Right: Hip) Diagnosis: (Degenerative joint disease, right hip)    Surgeons: Rogelio Calloway MD Anesthesiologist: Fab Macias MD    Anesthesia Type: spinal ASA Status: 2            Anesthesia Type: spinal    Vitals Value Taken Time   /86 04/08/25 1242   Temp 97.5 04/08/25 1242   Pulse 76 04/08/25 1242   Resp 16 04/08/25 1242   SpO2 99 04/08/25 1242       [facility] AN Post Evaluation:   Patient Evaluated in PACU  Patient Participation: complete - patient participated  Level of Consciousness: awake and alert  Pain Management: adequate  Airway Patency:patent  Yes    Nausea/Vomiting: none  Cardiovascular Status: acceptable  Respiratory Status: acceptable and nasal cannula  Postoperative Hydration acceptable      Fab Macias MD  4/8/2025 12:42 PM

## 2025-04-08 NOTE — OPERATIVE REPORT
OPERATIVE REPORT     PROCEDURE DATE: 4/8/2025     SURGEON: Rogelio Calloway MD      ASSISTANT SURGEON: Valerio Rodriges MD  (No qualified resident was available to assist with this case; we will thus bill for fellow)    ASSISTANT: Marci surgical assist.     PREOPERATIVE DIAGNOSIS: Degenerative joint disease, right hip     POSTOPERATIVE DIAGNOSIS: Same    PROCEDURE: right total hip arthoplasty    ANESTHESIA: Spinal    PREOPERATIVE ANTIBIOTICS: 2Ancef 2 g IV within 60 minutes of procedure start.     ESTIMATED BLOOD LOSS: 200 mL.    ESTIMATED IV FLUIDS: see anesthesia record    ESTIMATED URINE OUTPUT: no john     IMPLANTS   1. Alisa G7 acetabular shell, multihole, 50 mm outer diameter, size D.   2. 6.5 mm bone screws x 2  3. Alisa G7 highly cross-linked polyethylene liner; neutral, 36 mm inner diameter, size D.   4. Alisa Avenir complete collarless stem, Coxa vara,  size 2.   5. Alisa Biolox Delta ceramic femoral head, 36 mm, +3.5, 12-14 taper.     SPECIMENS  Femoral head to pathology    COMPLICATIONS   None apparent.     FINDINGS   Consistent with end-stage hip degenerative joint disease.     INDICATIONS   Kerline Vizcaino is a 69 year old female who has been followed by the orthopedic surgery service for right hip degenerative joint disease. Kerline Vizcaino was previously seen and evaluated in the orthopedic clinic and diagnosed with hip degenerative joint disease. After nonoperative treatment measures such as physical therapy, activity modification, weight loss, and NSAIDs failed to improved the patient's symptoms, Kerline Vizcaino wished to proceed with surgery and was therefore scheduled for the above-described procedure on an elective basis.     TECHNIQUE   Kerline Vizcaino was identified and greeted in the preoperative holding area and was identified using medical record number, name, and date of birth, all of which were confirmed. The operative hip was marked using an indelible marker and informed consent was  verbally confirmed. The patient had previously signed a consent in clinic. Once again, all risks and benefits as well as alternatives to surgical intervention were discussed with the patient in detail and all the questions were answered. Risks discussed included but were not limited to pain, infection, bleeding, scarring, stiffness, thromboembolic events, severe limb dysfunction, loss of limb, and loss of life. The patient verbally confirmed the consent and wished to proceed with surgery as scheduled.     The anesthesia service then proceeded with anesthesia and Kerline Vizcaino was taken to the operating room and placed on the operating table in the lateral decubitus position. Care was taken to pad all bony prominences well. The patient was locked in the lateral decubitus position using the standard positioners. The lower extremity was then prepped and draped in a standard fashion. A preprocedural timeout was then performed once again confirming the patient's correct identity, correct procedure, correct side, and correct site. In addition, allergy status and required equipment were reviewed. Three independent members of the operating room team agreed on the above-mentioned parameters.     The ASIS was identified and approximately 2-3 cm posterior to it on the illiac crest a percutaneous stab incision was made and a mya pin was placed.  A second incision was also made and a second mya pin was placed.  The hip navigation [employer] platform was attached to the two pins.  Next, A standard posterior approach to the operative hip was then performed in the usual fashion. Once the gluteus mallory fascia was identified, it was split in line with its fibers under careful hemostasis using a Bovie. The tip of the trochanter was then identified and a standard arthrotomy was performed, traveling from quadratus femoris in line with the gluteus medius fibers proximally. The posterior hip capsule was tacked using #1  Ethibond suture.  Prior to hip dislocation, the intellijoint button was secured to the greater trochanter with 1 screw and the center of hip rotation was determined from the intellijoint system.  Next,  the hip was dislocated without complications. The saddle point of the femur and the lesser trochanter were directly palpated and visualized and freed of any interposed tissues. A standard neck cut was then performed according to preoperative templating using an oscillating saw and an osteotome.     The femur was then placed anteriorly to the acetabulum and retracted using an anterior retractor. A second retractor was placed on the posterior inferior aspect of the acetabulum. Next, any remaining labral remnants and scar tissue were carefully removed circumferentially from the acetabulum. The cotyloid fossa was cleared out of any soft tissues under careful hemostasis using a Bovie. Sequential reaming was then performed. Trialing was then performed which was found to fit the patient's anatomy well and abduction, anteversion and offset were all confirmed with the intellijoint system. Accordingly, an appropriately sized Alisa G7 acetabular component was then placed and impacted into place using gentle mallet blows. It was secured in place using two 6.5 mm bone screws. A poly trial was then placed.     Attention was then turned to the femur and box osteotome was used to lateralize and a canal finder was used to obtain the adequate trajectory. Sequential broaching was then performed using a Alisa M/L taper broaches. Trialing was then performed and the components were found to achieve excellent stability. Leg length was found to be equal. Accordingly, all trials and the femoral broach were removed.     The definitive Alisa G7 liner was placed and impacted using gentle mallet blows. Once its adequate position was confirmed, attention was turned back to the femur and the above documented Alisa Avenir complete was impacted  into the proximal femur. Care was taken to avoid any calcar fractures. Trialing was once again commenced and excellent stability was achieved. Accordingly, the head was exchanged for the Biolox ceramic head specifically documented above. The hip was once again reduced and final time taken through range of motion with adequate stability and offset, anteversion and abduction again confirmed using the [employer] navigation system.  The two steimann pinns as well as the trochanter button and screw were all removed and accounted for prior to closure.     The hip was then irrigated, and the posterior capsule was carefully closed. It was sutured into the gluteus medius tendon and its insertion at the greater trochanter. A tight capsular repair was achieved. The hip was then once again irrigated using normal saline and closed in a layered fashion. The gluteus mallory fascia was closed using #2 ethibond and #2 quil, and a local anesthetic cocktail was injected into the fascia and subcutaneous tissue circumferentially tamiko-incisionally. Subcutaneous closure was achieved using #0 vicryl and 2-0 vicryl, and the skin was closed using staples. Skin was dressed using a SILVERLON dressing. Pin site incisions were closed with 2-0 monocryl and dermabond and dressed with 4x4 and tegaderm.     The patient was then rolled back into the supine position, transferred onto a regular bed and brought to PACU in stable condition.At the end of the procedure, all sponge, needle, instrument, and scalpel counts were performed and found to be correct.     Attending physician Dr. Calloway was scrubbed and present for all key parts of the procedure. He supervised the entire procedure.       POSTOPERATIVE PLAN  -admit to orthopedics  -pain control  -DVT ppx: mechanical + 325mg PO aspirin BID  -Weight bearing as tolerated on the right lower extremity, posterior hip precautions  -PT on POD #0  -Medicine co-management        Valerio Rodriges MD  Fellow  - Adult Reconstruction  Department of Orthopaedic Surgery  4/8/2025  12:53 PM

## 2025-04-08 NOTE — CM/SW NOTE
Department  notified of request for HHC, aidin referrals started. Assigned CM/SW to follow up with pt/family on further discharge planning.     Marcy Roland, DSC

## 2025-04-08 NOTE — ANESTHESIA PREPROCEDURE EVALUATION
Anesthesia PreOp Note    HPI:     Kerline Vizcaino is a 69 year old female who presents for preoperative consultation requested by: Rogelio Calloway MD    Date of Surgery: 4/8/2025    Procedure(s):  Right total hip arthroplasty  Indication: Degenerative joint disease, right hip    Relevant Problems   No relevant active problems       NPO:                         History Review:  There are no active problems to display for this patient.      Past Medical History:    Adrenal cyst (HCC)    Bipolar affective (HCC)    GERD (gastroesophageal reflux disease)    Hiatal hernia    7cm    HLD (hyperlipidemia)    Osteoarthritis    Osteopenia of lumbar spine    Overactive bladder    PONV (postoperative nausea and vomiting)    Renal disorder    Kidney function acceted by Lithium medication    Stage 3 chronic kidney disease (HCC)    Visual impairment    reading and driving wears glasses    Zenker diverticulum       Past Surgical History:   Procedure Laterality Date    Colonoscopy      Upper gi endoscopy,exam         Prescriptions Prior to Admission[1]  Current Medications and Prescriptions Ordered in Epic[2]    Allergies[3]    Family History   Problem Relation Age of Onset    Other (aortic valve surgery) Mother     Bipolar Disorder Mother     Other (MI) Father     Other (osteoarthritis) Sister     Bipolar Disorder Maternal Grandmother      Social History     Socioeconomic History    Marital status:    Tobacco Use    Smoking status: Never     Passive exposure: Past    Smokeless tobacco: Never   Vaping Use    Vaping status: Never Used   Substance and Sexual Activity    Alcohol use: Yes     Comment: 3 drinks on weekends only    Drug use: Never       Available pre-op labs reviewed.  Lab Results   Component Value Date    WBC 8.4 03/21/2025    RBC 4.09 03/21/2025    HGB 12.9 03/21/2025    HCT 38.5 03/21/2025    MCV 94.1 03/21/2025    MCH 31.5 03/21/2025    MCHC 33.5 03/21/2025    RDW 13.9 03/21/2025    .0 03/21/2025      Lab Results   Component Value Date     03/21/2025    K 4.8 03/21/2025     03/21/2025    CO2 28.0 03/21/2025    BUN 30 (H) 03/21/2025    CREATSERUM 1.28 (H) 03/21/2025    GLU 70 03/21/2025    CA 9.5 03/21/2025          Vital Signs:  Body mass index is 27.07 kg/m².   height is 1.575 m (5' 2\") and weight is 67.1 kg (148 lb). Her oral temperature is 98.2 °F (36.8 °C). Her blood pressure is 139/82 and her pulse is 57. Her respiration is 18 and oxygen saturation is 99%.   Vitals:    04/04/25 1601 04/08/25 0807   BP:  139/82   Pulse:  57   Resp:  18   Temp:  98.2 °F (36.8 °C)   TempSrc:  Oral   SpO2:  99%   Weight: 67.6 kg (149 lb) 67.1 kg (148 lb)   Height: 1.727 m (5' 8\") 1.575 m (5' 2\")        Anesthesia Evaluation     Patient summary reviewed and Nursing notes reviewed    History of anesthetic complications   Airway   Mallampati: II  TM distance: >3 FB  Neck ROM: full  Dental    (+) implants    Pulmonary - negative ROS and normal exam   Cardiovascular - negative ROS and normal exam    ECG reviewed    Neuro/Psych    (+)  neuromuscular disease,  bipolar disorder      GI/Hepatic/Renal    (+) GERD, chronic renal disease CRI    Endo/Other - negative ROS   Abdominal                  Anesthesia Plan:   ASA:  2  Plan:   Spinal  Post-op Pain Management: IV analgesics  Plan Comments: , no blood thinners - proceed with plain spinal  Informed Consent Plan and Risks Discussed With:  Patient and spouse      I have informed Kerline Vizcaino and/or legal guardian or family member of the nature of the anesthetic plan, benefits, risks including possible dental damage if relevant, major complications, and any alternative forms of anesthetic management.   All of the patient's questions were answered to the best of my ability. The patient desires the anesthetic management as planned.  Fab Macias MD  4/8/2025 8:14 AM  Present on Admission:  **None**           [1]   Medications Prior to Admission   Medication  Sig Dispense Refill Last Dose/Taking    TURMERIC-GINGER OR Take 1 capsule by mouth daily.   Past Week    psyllium Oral Powd Pack Take 1 packet by mouth 2 (two) times daily as needed.   Past Week    rosuvastatin 10 MG Oral Tab Take 1 tablet (10 mg total) by mouth every morning.   4/8/2025 at  5:00 AM    Estradiol 10 MCG Vaginal Tab Place 10 mcg vaginally twice a week.   4/5/2025    nitrofurantoin monohydrate macro (MACROBID) 100 MG Oral Cap Take 1 capsule (100 mg total) by mouth once as needed (as needed after intercorse).   Past Week    celecoxib 200 MG Oral Cap Take 1 capsule (200 mg total) by mouth daily as needed.   3/30/2025    Glucosamine HCl-Glucosamin SO4 (GLUCOSAMINE COMPLEX OR) Take by mouth daily.   Past Week    ALPRAZolam 0.5 MG Oral Tab Take 0.5 tablets (0.25 mg total) by mouth nightly as needed for Sleep.   4/6/2025    Esomeprazole Magnesium 20 MG Oral Capsule Delayed Release Take 1 capsule (20 mg total) by mouth every morning before breakfast.   4/8/2025 at  5:00 AM    cyanocobalamin 1000 MCG Oral Tab Take 1 tablet (1,000 mcg total) by mouth daily.   Past Week    Chelated Magnesium 100 MG Oral Tab Take by mouth daily.   Past Week    Cholecalciferol (VITAMIN D3) 25 MCG (1000 UT) Oral Cap Take 1 tablet by mouth daily.   Past Week    Lithium Carbonate 300 MG Oral Cap Take 1 capsule (300 mg total) by mouth every morning.   4/8/2025 at  5:00 AM   [2]   Current Facility-Administered Medications Ordered in Epic   Medication Dose Route Frequency Provider Last Rate Last Admin    lactated ringers infusion   Intravenous Continuous Rogelio Calloway MD        acetaminophen (Tylenol Extra Strength) tab 1,000 mg  1,000 mg Oral Once Rogelio Calloway MD        celecoxib (CeleBREX) cap 200 mg  200 mg Oral Once Rogelio Calloway MD        oxyCODONE ER (OxyCONTIN ER) 12 hr tab 10 mg  10 mg Oral Once Rogelio Calloway MD        ceFAZolin (Ancef) 2g in 10mL IV syringe premix  2 g Intravenous Once Rogelio Calloway  MD ANEESH        clonidine-EPINEPHrine-ropivacaine-ketorolac (CERTS) (Duraclon-Adrenalin-Naropin-Toradol) pain cocktail irrigation   Intra-articular Once (Intra-Op) Rogelio Calloway MD         No current Epic-ordered outpatient medications on file.   [3]   Allergies  Allergen Reactions    Codeine OTHER (SEE COMMENTS)     Interacted with lithium at the time causing manic episode

## 2025-04-08 NOTE — INTERVAL H&P NOTE
Pre-op Diagnosis: Degenerative joint disease, right hip    The above referenced H&P was reviewed by Valerio Rodriges MD on 4/8/2025, the patient was examined and no significant changes have occurred in the patient's condition since the H&P was performed.  I discussed with the patient and/or legal representative the potential benefits, risks and side effects of this procedure; the likelihood of the patient achieving goals; and potential problems that might occur during recuperation.  I discussed reasonable alternatives to the procedure, including risks, benefits and side effects related to the alternatives and risks related to not receiving this procedure.  We will proceed with procedure as planned.

## 2025-04-08 NOTE — ANESTHESIA PROCEDURE NOTES
Spinal Block    Date/Time: 4/8/2025 10:15 AM    Performed by: Fab Macias MD  Authorized by: Fab Macias MD      General Information and Staff    Start Time:  4/8/2025 10:15 AM  End Time:  4/8/2025 10:19 AM  Anesthesiologist:  Fab Macias MD  Performed by:  Anesthesiologist  Patient Location:  OR  Site identification: surface landmarks    Reason for Block: at surgeon's request and surgical anesthesia    Preanesthetic Checklist: patient identified, IV checked, risks and benefits discussed, monitors and equipment checked, pre-op evaluation, timeout performed, anesthesia consent and sterile technique used      Procedure Details    Patient Position:  Sitting  Prep: ChloraPrep    Monitoring:  Cardiac monitor, heart rate and continuous pulse ox  Approach:  Midline  Location:  L3-4  Injection Technique:  Single-shot    Needle    Needle Type:  Sprotte  Needle Gauge:  24 G  Needle Length:  3.5 in    Assessment    Sensory Level:   Events: clear CSF, CSF aspirated, well tolerated and blood negative      Additional Comments     Scoliosis noted but more in thoracic spine, easy spinal with 25ga, CSF aspirated, slow injection of meds, positive aspiration at the end of injection

## 2025-04-08 NOTE — CM/SW NOTE
MDO received for post op. Per previous documentation, patient arranged pre op with [facility]. Referral sent in aidin and agency reserved.     Order and F2F to be sent via aidin when entered by VAL Snowden RN, BSN

## 2025-04-08 NOTE — CONSULTS
[facility]    PATIENT'S NAME: ANA SMITH   ATTENDING PHYSICIAN: Rogelio Calloway MD   CONSULTING PHYSICIAN: Martín Stafford MD   PATIENT ACCOUNT#:   [id number]    LOCATION:  [facility] PACU [address] [address]  MEDICAL RECORD #:   [id number]       YOB: 1955  ADMISSION DATE:       04/08/2025      CONSULT DATE:  04/08/2025    REPORT OF CONSULTATION      REASON FOR ADMISSION:  Post right total hip arthroplasty.    HISTORY OF PRESENT ILLNESS:  Patient is a 69-year-old  female with chronic right hip pain and underlying severe primary osteoarthritis, failed outpatient conservative medical management options.  Scheduled today for above-mentioned procedure by her orthopedic surgeon, Dr. Calloway.  Preoperatively, she had spinal block.  Postoperatively, transferred to PACU further monitoring.      PAST MEDICAL HISTORY:  Generalized osteoarthritis, bipolar affective disorder, hyperlipidemia, gastroesophageal reflux disease, chronic kidney disease stage 3.    PAST SURGICAL HISTORY:  None.    MEDICATIONS:  Please see medication reconciliation list.     ALLERGIES:  Side effects to codeine.  No known drug allergies.     SOCIAL HISTORY:  No tobacco or drug use.  Occasional alcohol.  Independent for basic activities of daily living.     FAMILY HISTORY:  Mother had valvular heart disease and bipolar affective disorder.  Father had coronary artery disease.    REVIEW OF SYSTEMS:  Currently resting in bed.  No right hip pain.  No chest pain.  No shortness of breath.  Other 12-point review of systems is negative.       PHYSICAL EXAMINATION:    GENERAL:  Alert and oriented to time, place and person.  No acute distress.   VITAL SIGNS:  Temperature 97.7, pulse 69, respiratory rate 14, blood pressure 158/93, pulse ox 100% on 3L nasal cannula oxygen.  HEENT:  Atraumatic.  Oropharynx clear.  Moist mucous membranes.  Ears and nose normal.  Eyes:  Anicteric sclerae.   NECK:  Supple.  No lymphadenopathy.  Trachea  midline.    LUNGS:  Clear to auscultation bilaterally.  Normal respiratory effort.   HEART:  Regular rate and rhythm.  S1 and S2 auscultated.  No murmur.    ABDOMEN:  Soft, nondistended.  No tenderness.  Positive bowel sounds.   EXTREMITIES:  Right hip dressing.  No leg edema.  No clubbing or cyanosis.   NEUROLOGIC:  Decreased sensation and muscle movement in both lower extremities, post spinal block.  No other focal findings.    ASSESSMENT AND PLAN:    1.   Right hip primary osteoarthritis, status post right total hip arthroplasty.  Spinal block.  Pain control.  Neurovascular checks.  Aspirin for DVT prophylaxis.  Physical and occupational therapy.   2.   Bipolar affective disorder.  Continue home medications.   3.   Hyperlipidemia.  Continue home medications.   4.   Chronic kidney disease stage 3.  Continue to monitor postoperatively.    Dictated By Martín Stafford MD  d: 04/08/2025 13:23:07  t: 04/08/2025 13:36:55  Job [id number]  FB/

## 2025-04-09 LAB
HCT VFR BLD AUTO: 32.2 % (ref 35–48)
HGB BLD-MCNC: 10.5 G/DL (ref 12–16)

## 2025-04-09 PROCEDURE — 99214 OFFICE O/P EST MOD 30 MIN: CPT | Performed by: HOSPITALIST

## 2025-04-09 RX ORDER — TRAMADOL HYDROCHLORIDE 50 MG/1
50 TABLET ORAL EVERY 6 HOURS SCHEDULED
Status: SHIPPED | COMMUNITY
Start: 2025-04-09

## 2025-04-09 RX ORDER — OXYCODONE HYDROCHLORIDE 5 MG/1
5 TABLET ORAL EVERY 4 HOURS PRN
Status: SHIPPED | COMMUNITY
Start: 2025-04-09

## 2025-04-09 RX ORDER — SENNOSIDES 8.6 MG
325 CAPSULE ORAL 2 TIMES DAILY
Status: SHIPPED | COMMUNITY
Start: 2025-04-09

## 2025-04-09 RX ORDER — ACETAMINOPHEN 325 MG/1
650 TABLET ORAL EVERY 8 HOURS SCHEDULED
Status: SHIPPED | COMMUNITY
Start: 2025-04-09

## 2025-04-09 RX ORDER — PANTOPRAZOLE SODIUM 40 MG/1
40 TABLET, DELAYED RELEASE ORAL
Status: SHIPPED | COMMUNITY
Start: 2025-04-10

## 2025-04-09 NOTE — DISCHARGE SUMMARY
Ortho Discharge Summary     Patient ID:  Kerline Vizcaino  [id number]  69 year old  12/23/1955      Admit Date: 4/8/2025    Discharge Date: ***    Attending Physician: Rogelio Calloway MD     Reason for admission: right hip DJD    Discharge Diagnoses: Degenerative joint disease, right hip  S/P hip replacement, right    Discharge Condition: good    History of Present Illness:  Kerline Vizcaino is a 69 year old female who has been followed by the orthopedic surgery service for right hip degenerative joint disease. Kerline Vizcaino was previously seen and evaluated in the orthopedic clinic and diagnosed with hip degenerative joint disease. After nonoperative treatment measures such as physical therapy, activity modification, weight loss, and NSAIDs failed to improved the patient's symptoms, Kerline Vizcaino wished to proceed with surgery and was therefore scheduled for the above-described procedure on an elective basis.      All risks, benefits and alternatives for total hip were discussed with the patient and informed consent was obtained for the surgical procedure. The risks discussed included but were not limited to: infection, nerve injury, arterial injury, stiffness, numbness, wear, lysis, need for re-operation, DVT, PE, loss of limb and loss of life. Medical clearance was obtained and there were no contraindications to move forward with surgery on 4/8/2025.      Hospital Course:  The patient was taken to the operating room on the day of admission for Procedure(s): right total hip arthroplasty. The patient tolerated the procedure well and was taken to the post operative room in a stable condition. The patient was noted to be neurovascularly intact post operatively. The post operative films show components in excellent condition.     The patient dressing was checked on post operative day 1 and found to be dry and intact. The gauze dressing was left in place. Occupational and physical therapy worked with the patient each day and  they performed well. The patient had adequate pain control with oral medication. They were given 24 hours of perioperative antibiotic prophylaxis. The patient was managed in conjunction with the medical team for general medical issues. The patient developed right lower leg weakness and numbness with concern for femoral neuropathy. Neurology was consulted, the patient had an MRI that showed no sign of lumbosacral plexus injury. Suspect for local nerve irritation. The patient had improving symptoms throughout her inpatient stay but was given a knee immobilizer for temporary support. The patient met physical therapy goals and was discharged.     Rogelio Calloway MD was aware of all events throughout the hospital course and supervised this patient's care. The discharge of this patient was coordinated with the discharge planner and was discharged on the medication as listed in medical reconciliation.    Consults: ANCILLARY CONSULT TO CASE MANAGEMENT, internal medicine, NEUROLOGY    Disposition: stable, discharge to home with home health       Discharge Medications        START taking these medications        Instructions Prescription details   acetaminophen 325 MG Tabs  Commonly known as: Tylenol      Take 2 tablets (650 mg total) by mouth every 8 (eight) hours.   Refills: 0     aspirin 325 MG Tbec      Take 1 tablet (325 mg total) by mouth in the morning and 1 tablet (325 mg total) before bedtime.   Refills: 0     Naloxone HCl 4 MG/0.1ML Liqd      4 mg by Nasal route as needed. If patient remains unresponsive, repeat dose in other nostril 2-5 minutes after first dose.   Quantity: 1 kit  Refills: 0     oxyCODONE 5 MG Tabs      Take 1 tablet (5 mg total) by mouth every 4 (four) hours as needed.   Refills: 0     pantoprazole 40 MG Tbec  Commonly known as: Protonix  Replaces: Esomeprazole Magnesium 20 MG Cpdr      Take 1 tablet (40 mg total) by mouth every morning before breakfast.   Refills: 0     Sennosides 17.2 MG  Tabs      Take 1 tablet (17.2 mg total) by mouth 2 (two) times daily.   Refills: 0     traMADol 50 MG Tabs  Commonly known as: Ultram      Take 1 tablet (50 mg total) by mouth every 6 (six) hours.   Refills: 0            CONTINUE taking these medications        Instructions Prescription details   celecoxib 200 MG Caps  Commonly known as: CeleBREX      Take 1 capsule (200 mg total) by mouth daily as needed.   Refills: 0            STOP taking these medications      Esomeprazole Magnesium 20 MG Cpdr  Commonly known as: NEXIUM  Replaced by: pantoprazole 40 MG Tbec               ASK your doctor about these medications        Instructions Prescription details   ALPRAZolam 0.5 MG Tabs  Commonly known as: Xanax      Take 0.5 tablets (0.25 mg total) by mouth nightly as needed for Sleep.   Refills: 0     Chelated Magnesium 100 MG Tabs      Take by mouth daily.   Refills: 0     cyanocobalamin 1000 MCG Tabs  Commonly known as: Vitamin B12      Take 1 tablet (1,000 mcg total) by mouth daily.   Refills: 0     Estradiol 10 MCG Tabs      Place 10 mcg vaginally twice a week.   Refills: 0     GLUCOSAMINE COMPLEX OR      Take by mouth daily.   Refills: 0     lithium carbonate 300 MG Caps      Take 1 capsule (300 mg total) by mouth every morning.   Refills: 0     Macrobid 100 MG Caps  Generic drug: nitrofurantoin monohydrate macro      Take 1 capsule (100 mg total) by mouth once as needed (as needed after intercorse).   Refills: 0     psyllium Pack  Commonly known as: Metamucil      Take 1 packet by mouth 2 (two) times daily as needed.   Refills: 0     rosuvastatin 10 MG Tabs  Commonly known as: Crestor      Take 1 tablet (10 mg total) by mouth every morning.   Refills: 0     TURMERIC-GINGER OR      Take 1 capsule by mouth daily.   Refills: 0     Vitamin D3 25 MCG (1000 UT) Caps      Take 1 tablet by mouth daily.   Refills: 0               Where to Get Your Medications        These medications were sent to [id number]  - [place], [place] - [address] AT [facility] [address], [phone], [phone]  [address], [facility] [address]      Phone: [phone]   Naloxone HCl 4 MG/0.1ML Liqd           Patient Instructions:     Activity: activity as tolerated and no driving for 2 weeks    Diet: regular     Wound Care: The incision is covered with a Silverlon (gauze) dressing. Please protect the incision with Saran wrap (brand name only) over the dressing and tape occlusively.  The Saran wrap is “water resistant” only.   DO NOT position yourself with the incisional area directly in the stream of water.  After shower, remove the Saran wrap and change your dressing. Change dressing daily.     Home Health Agency:  [facility]    RICK Muse  Nurse Practitioner for Dr. Rogelio Calloway  P: [phone]

## 2025-04-09 NOTE — PROGRESS NOTES
[facility]  part of [facility]    Progress Note    Kerline Vizcaino Patient Status:  Outpatient in a Bed    1955 MRN [id number]   Location [facility] Attending Messi Duggan MD   Hosp Day # 0 PCP GABBY GARCIA       Subjective:     Pt has numb area on medial right leg.   Pt with weakness of right leg and cannot stand on the leg.  Pt had numbness and weakness of right foot yesterday however this is resolved today.    Objective:   Blood pressure 134/76, pulse 60, temperature 98.2 °F (36.8 °C), resp. rate 16, height 62\", weight 148 lb (67.1 kg), SpO2 93%.    Gen:   NAD.  A and O x 3  CV:   RRR, no m/g/r  Pulm:   CTA bilat  Abd:   +bs, soft, NT, ND  LE:   No c/c/e  Right hip wound c/d/i  Neuro:   weakness of right leg, otherwise nonfocal    Results:     Lab Results   Component Value Date    WBC 8.4 2025    HGB 10.5 (L) 2025    HCT 32.2 (L) 2025    .0 2025    CREATSERUM 1.28 (H) 2025    BUN 30 (H) 2025     2025    K 4.8 2025     2025    CO2 28.0 2025    GLU 70 2025    CA 9.5 2025    ALB 4.4 2025    ALKPHO 50 (L) 2025    BILT 0.5 2025    TP 6.9 2025    AST 29 2025    ALT 16 2025       XR HIP W OR WO PELVIS 1 VIEW, RIGHT (CPT=73501)  Result Date: 2025  CONCLUSION: Status post total right hip arthroplasty.    Dictated by (CST): Alejandro Lui MD on 2025 at 1:50 PM     Finalized by (CST): Alejandro Lui MD on 2025 at 1:50 PM                Assessment and Plan:     1.       Right hip primary osteoarthritis  POD #1 s/p right HUANG  Right leg weakness, likely nerve block wearing off  - pain control  - PT//OT  - aspirin for dvt proph    2.       Bipolar affective disorder.  Continue home medications.   3.       Hyperlipidemia.  Continue home medications.   4.       Chronic kidney disease stage 3.  Continue to monitor postoperatively.    MDM:     High Messi Tejada MD  4/9/2025

## 2025-04-09 NOTE — PHYSICAL THERAPY NOTE
PHYSICAL THERAPY HIP EVALUATION - INPATIENT     Room Number: Room 8/Room 8-A  Evaluation Date: 4/9/2025  Type of Evaluation: Initial  Physician Order: PT Eval and Treat    Presenting Problem: s/p R HUANG  Co-Morbidities : Adrenal cyst (HCC)    Bipolar affective (HCC)   GERD (gastroesophageal reflux disease)   Hiatal hernia    7cm   HLD (hyperlipidemia)   Osteoarthritis   Osteopenia of lumbar spine   Overactive bladder   PONV (postoperative nausea and vomiting)   Stage 3 chronic kidney disease (HCC)   Visual impairment    reading and driving wears glasses   Zenker diverticulum  Reason for Therapy: Mobility Dysfunction and Discharge Planning    PHYSICAL THERAPY ASSESSMENT   Patient is a 69 year old female admitted 4/8/2025 for s/p R HUANG.  Prior to admission, patient's baseline is independent.  Patient is currently functioning below baseline with bed mobility, transfers, gait, stair negotiation, maintaining seated position, standing prolonged periods, and performing household tasks.  Patient is requiring moderate assist as a result of the following impairments: decreased functional strength, decreased endurance/aerobic capacity, pain, impaired standing balance, decreased muscular endurance, and medical status.  Physical Therapy will continue to follow for duration of hospitalization.    Patient will benefit from continued skilled PT Services to facilitate return to prior level of function as patient demonstrates high motivation with excellent tolerance to an intensive therapy program  (versus return home pending progress/improvement in impaired RLE sensation)    PLAN DURING HOSPITALIZATION  Nursing Mobility Recommendation :  (1-2 assist for SPT)     PT Treatment Plan: Bed mobility, Body mechanics, Coordination, Endurance, Energy conservation, Gait training, Strengthening, Stair training, Transfer training, Balance training  Rehab Potential : Fair  Frequency (Obs): Daily     PHYSICAL THERAPY MEDICAL/SOCIAL HISTORY    History related to current admission: Patient is a 69-year-old  female with chronic right hip pain and underlying severe primary osteoarthritis, failed outpatient conservative medical management options. Scheduled today for above-mentioned procedure by her orthopedic surgeon, Dr. Callowya. Preoperatively, she had spinal block. Postoperatively, transferred to PACU further monitoring.      Problem List  Principal Problem:    Primary osteoarthritis of right hip  Active Problems:    Bipolar affective disorder ([facility])    Hyperlipidemia    CKD (chronic kidney disease) stage 3, GFR 30-59 ml/min ([facility])    S/P hip replacement, right    HOME SITUATION  Type of Home: House  Home Layout: Multi-level  Stairs to Enter : 5   Stairs to Bedroom: 8    Railing: Yes    Lives With: Spouse    Patient Regularly Uses: Rolling walker, Cane     Prior Level of [place]: Prior to admission patient was independent with performance of all ADL's and performance of functional mobility with no assistive device.    SUBJECTIVE  \"My right leg is still asleep\"    PHYSICAL THERAPY EXAMINATION   OBJECTIVE  Precautions: Bed/chair alarm, HUANG - posterior  Fall Risk: High fall risk    WEIGHT BEARING RESTRICTION  R Lower Extremity: Weight Bearing as Tolerated    PAIN ASSESSMENT  Rating: Unable to rate  Location: RLE  Management Techniques: Activity promotion, Body mechanics, Breathing techniques, Relaxation, Repositioning    COGNITION  Overall Cognitive Status:  WFL - within functional limits  Arousal/Alertness:  appropriate responses to stimuli  Orientation Level:  oriented x4  Safety Judgement:  good awareness of safety precautions    RANGE OF MOTION AND STRENGTH ASSESSMENT  Upper extremity ROM and strength are within functional limits BUE.  Lower extremity ROM is within functional limits BLE passively.  Lower extremity strength is within functional limits except for the following:    Right Hip flexion  0/5  Left Hip flexion  3+/5  Right Knee  extension  2-/5  Left Knee extension  4/5  Right Knee flexion  2-/5  Left Knee flexion  4/5  Right Dorsiflexion  4/5  Left Dorsiflexion  3/5    BALANCE  Static Sitting: Good  Dynamic Sitting: Fair  Static Standing: Poor  Dynamic Standing: Dependent    AM-PAC '6-Clicks' INPATIENT SHORT FORM - BASIC MOBILITY  How much difficulty does the patient currently have...  Patient Difficulty: Turning over in bed (including adjusting bedclothes, sheets and blankets)?: A Little   Patient Difficulty: Sitting down on and standing up from a chair with arms (e.g., wheelchair, bedside commode, etc.): A Little   Patient Difficulty: Moving from lying on back to sitting on the side of the bed?: A Lot   How much help from another person does the patient currently need...   Help from Another: Moving to and from a bed to a chair (including a wheelchair)?: A Lot   Help from Another: Need to walk in hospital room?: Total   Help from Another: Climbing 3-5 steps with a railing?: Total     AM-PAC Score:  Raw Score: 12   Approx Degree of Impairment: 68.66%   Standardized Score (AM-PAC Scale): 35.33   CMS Modifier (G-Code): CL    FUNCTIONAL ABILITY STATUS  Functional Mobility/Gait Assessment  Gait Assistance: Not tested  Rolling: stand-by assist  Supine to Sit: minimal assist  Sit to Supine: moderate assist  Sit to Stand: moderate assist  Bed to Chair: moderate assist (CGA of second person for safety)    Exercise/Education Provided:  Education Provided To: Patient Patient Education: Role of Physical Therapy, Discharge Recommendations, Plan of Care, DME Recommendations, Functional Transfer Techniques, Weight Bear Status, Fall Prevention, Surgical Precautions, Posture/Positioning, Energy Conservation, Proper Body Mechanics, Gait Training Patient's Response to Education: Verbalized Understanding, Requires Further Education     Skilled Therapy Provided: Patient received supine in bed at initiation of session agreeable to participation in PT. Session  overlapped with OT. Patient tolerates treatment fairly, most limited this session due to impaired sensation and weakness of RLE. Patient requires minimal assist to perform supine to sit transfer. Moderate assistance to perform sit to stand (R knee buckling noted with Wb'ing requiring R knee block). Patient requesting to use bathroom. Moderate assist to perform stand pivot to/from rolling chair with second person for safety. Reviewed posterior hip precautions with patient during session, verbalizes understanding. Patient left supine in bed, lines intact, needs in reach and handoff to RN.    The patient's Approx Degree of Impairment: 68.66% has been calculated based on documentation in the [facility] '6 clicks' Inpatient Basic Mobility Short Form.  Research supports that patients with this level of impairment may benefit from rehab versus home pending progress.  Final disposition will be made by interdisciplinary medical team.    Patient End of Session: In bed, Needs met, Call light within reach, RN aware of session/findings, All patient questions and concerns addressed, Hospital anti-slip socks, Alarm set    CURRENT GOALS  Goals to be met by: 4/23/25  Patient Goal Patient's self-stated goal is: to return home   Goal #1 Patient is able to demonstrate supine - sit EOB @ level: modified independent   Goal #1   Current Status    Goal #2 Patient is able to demonstrate transfers Sit to/from Stand at assistance level: modified independent     Goal #2  Current Status    Goal #3 Patient is able to ambulate 300 feet with assistive device at assistance level: modified independent    Goal #3   Current Status    Goal #4 Patient will negotiate 8 stairs/one curb w/ assistive device and supervision   Goal #4   Current Status    Goal #5 Patient verbalizes and/or demonstrates all precautions and safety concerns independently   Goal #5   Current Status    Goal #6 Patient independently performs home exercise program for ROM/strengthening per  the instructions provided in preparation for discharge.   Goal #6  Current Status      Patient Evaluation Complexity Level:  History High - 3 or more personal factors and/or co-morbidities   Examination of body systems Moderate - addressing a total of 3 or more elements   Clinical Presentation  Moderate - Evolving   Clinical Decision Making  Moderate Complexity     Therapeutic Activity:  23 minutes    Katia Cowan, PT, DPT

## 2025-04-09 NOTE — DISCHARGE INSTRUCTIONS
Sometimes managing your health at home requires assistance.  The [facility] team has recognized your preference to use [facility], Phone: [phone] or Fax: [phone]. A representative from the home health agency will contact you or your family to schedule your first visit.              Frequently Asked Questions after  Total Hip/Knee Arthroplasty  Dr. Rogelio Calloway- Surgeon  Reina Mendoza - Nurse Practitioner   Kimberly Montoya -   [phone]    PLEASE TAKE YOUR MEDICATIONS AS DIRECTED. YOU SHOULD HAVE RECEIVED YOUR PRESCRIPTIONS BEFORE SURGERY:       (Estimated schedule)              -Aspirin 325mg - take 1 tablet by mouth twice daily with food for 4 weeks as directed   (6am, 6pm)     -Acetaminophen 500mg - take 2 tablets by mouth every 8 hours as directed    (6am, 2pm, 10pm)    -Celebrex 200mg - take 1 tablet by mouth once daily with food as directed     (6am)    -Gabapentin 100mg - take 2 capsules by mouth every 8 hours as directed     (6am, 2pm, 10pm)  -Pantoprazole 40mg - take 1 tablet by mouth daily as directed (while on aspirin therapy)   (6am)    -Tramadol 50mg - take 2 tablets by mouth every 8 hours as directed     (6am, 2pm, 10pm)  -Oxycodone 5mg - take 1 tablet by mouth every 4 hours as needed for breakthrough pain    -Senna Plus 8.6mg-50mg capsule - 2 capsules by mouth twice a day as needed for constipation    If you have any questions about your medications, please call our office [phone].    ----------------------------------------------------------------------------------------------------    Home health agency: [facility] [phone]  If they haven't contacted you already, they should call you once you return home. If you have not had a call by 11am, please call the number above.     When should I follow up with Dr. Calloway's team?  You should follow up with Dr. Calloway/ his Nurse Practitioner at 2weeks and 6  weeks after your surgery.  These appointments should have been made at the time you scheduled your surgery, and the dates/times should be in your [facility] information booklet and in your provider follow up.  If you aren't sure about this date, please call our office [phone]. Your     Is swelling normal?  YES! We anticipate swelling, but this can be managed well with ice and elevation.  Please ice/elevate 4 times per day for 30 minutes at a minimum. When elevating please make sure the surgical leg is higher than the heart. A good way to do this is to lay completely flat and put the leg on an arm rest of your couch with a few pillows under the ankle. Please make sure the leg is straight when elevating.   If you are short of breath or have calf pain please call us or go to the ER before elevating the leg.    How should I take care of my incision and dressing?  The gauze dressing should be removed after 7 days unless saturated before then.   If dressing becomes saturated before the 7 day rufino, please remove the gauze dressing sooner.  Stapled incisions: once the gauze dressing is removed, replaced with an ABD pad. Change daily.    How long am I going to be on a blood thinner?  You will be on a blood thinner (warfarin, aspirin, etc) for one month from your surgical date to prevent blood clots. Your medication is specified on your medication list.  If you were taking a blood thinner prior to surgery, you will continue this per the recommendation of the prescribing doctor.    How often should I have physical therapy after surgery?  If you are going directly to outpatient physical therapy:  Hip replacement- 3x/ week for 2 weeks  If you have home health care, this will be for a total of two weeks  Hip replacement- 3x/ week     How long should I be taking my medications?  Continue to take your blood thinner (ie aspirin, coumadin/ warfarin, lovenox) and pantoprazole for 1 month from surgery.  If you were prescribed an  anti-inflammatory medication (celecoxib, meloxicam, ibuprofen etc.), please continue to take this while on your blood thinner. Your pharmacist may instruct you differently. You will be on this for at least 6 weeks.  Continue your stool softener (colace) as long as your are taking narcotic pain medication.    What are the signs of infection I should look for?  It is common for the knee and hip to be red and warm after surgery  Our suspicion for infection rises with any of the followin.  There is pus like discharge from the incision  2.  Your temperature is over 101 degrees  3.  It is painful to move the leg through a gentle range of motion  IF ANY OF THESE THREE OCCUR PLEASE CONTACT OUR OFFICE IMMEDIATELY or if it is after hours, please dial “zero” to talk to the physician on call when you hear the voicemail.      When can I drive?  You can drive when you are off all narcotic pain medications (including Norco, Oxycontin, oxycodone).  You must also feel that you are capable of driving safely; meaning you can push on the gas and brake with force if needed.    When can I shower?  The incision is covered with a cotton dressing (ABD). Please protect the incision with Saran wrap (brand name only) over the dressing and tape occlusively.  The Saran wrap is “water resistant” only.   DO NOT position yourself with the incisional area directly in the stream of water.  After shower, remove the Saran wrap and change your dressing. Change dressing daily.  If you have a Prevena vac dressing, you will need to cover the dressing AND vac if you are going to shower.   If you have staples, please use Saran Wrap over the surgical wound when showering. It should NOT get wet while staples are in place.  When can I go in a hot tub/bath/pool?  3 months from your surgery if you incision has COMPLETELY healed (no scabs or open areas)    How do I get a refill on my pain medications if necessary before my next appointment?  Oxycodone and  Norco (hydrocodone- acetaminophen) require an electronic or physical prescription. Please phone the office at [phone] if you need a refills on medications as these need to be electronically prescribed.   Please understand that the requests will be taken care of as soon as possible but if it is the weekend they may not be refilled until Monday. Please allow 24- 48 hours on refill requests.    How do I get a handicap placard?  We are happy to provide you with ONE handicap placard at the time of your surgery which will be good for six months.  Please ask our office at your pre-op or post-op appointment if you feel you will need a placard.  If you feel you need a handicap placard for longer than 6 months from surgery please contact your primary care physician.    What do I do after home health has discharged me?  We do not routinely prescribe outpatient physical therapy for our hip patients. We will evaluate your progress at your 2 week appointment and determine then if therapy will be necessary. Otherwise, your home health physical therapist should teach you a home exercise program.     If you have any questions related to medical issues unrelated to your knee or hip, please contact the physician that cleared you for surgery:     If you have questions pertaining to the surgery, please feel free to contact our office:   Phone # [phone]  Fax # [phone]    If you have arranged for outpatient physical therapy upon your discharge from the hospital, you will need to remove the gauze surgical dressing on post operative day 7. You should have received instructions on how to properly do this before you left the hospital.     Dressing change instructions:   The gauze dressing is to be removed on post-op day 7, unless the dressing is not adhering properly--in which case the dressing is to be changed sooner.  To remove the dressing, see instructions below.      Shower instructions:  Stapled incisions: the  incision may NOT get wet in the shower after the gauze has been removed. Please wrap the your surgical wound in Saran Wrap when showering.    Call your surgeon's office if:  The dressing will not stay in place  If there is any skin issue such as tearing or blistering  More than 50% of the dressing becomes saturated  There is a large amount of fluid coming from the incision  You experience unusual pain or odor    Ice/elevate:  Please ice/elevate 4 times per day for 30 minutes at a minimum.  When elevating please make sure the surgical leg is higher than the heart.  A good way to do this is to lay completely flat and put the leg on an arm rest of your couch with a few pillows under the ankle.  Please make sure the leg is straight when elevating.  If you are short of breath or have calf pain please call us or go to the ER before elevating the leg.    Orthopaedic Discharge Bowel Program while on Pain Medications (Opiods)    Docusate sodium (Colace) 100 mg after breakfast and at bedtime.  Polyethylene glycol (Glycolax or Miralax)17 Gm Daily.  Mix well in glass of water.  Hold both docusate sodium (Colace) and polyethylene glycol (Miralax) if greater than three stools per day.  If no stool after two days at home take senna (Senakot) 1-2 tablets at bedtime.  Repeat nightly untill stool occurs.      Continue program as long as continuing opioids.

## 2025-04-09 NOTE — PROGRESS NOTES
4/9/2025  Admission date 4/8/2025      S: Patient doing well. Denies any numbness/tingling, F/C/S, N/V/D, SOB, Chest Pain, Abdominal Pain. Pain well-controlled. Very concerned that she continues with n/t to the RLE. Has improved from overnight when she could not move her foot and completely numb. Now with 5/5 distal motor strength but knee is buckling. Hypotension overnight.      O:    Vitals:    04/09/25 0837   BP: (!) 76/59   Pulse: 67   Resp: 16   Temp:        Data Review: {  Recent Results (from the past 24 hours)   Hemoglobin & Hematocrit    Collection Time: 04/09/25  5:22 AM   Result Value Ref Range    HGB 10.5 (L) 12.0 - 16.0 g/dL    HCT 32.2 (L) 35.0 - 48.0 %   RAINBOW DRAW LIGHT GREEN    Collection Time: 04/09/25  5:22 AM   Result Value Ref Range    Hold Lt Green Auto Resulted      Gen:  A&O x 3, VSS, Afebrile    Abd: Soft Nontender    Lower Extremity:   -  Dressing clean, dry, intact  -  2+ Pedal pulses Bilaterally  -  Tibialis Anterior/Gastroc and Soleus/ Extensor Halluses Longus motor in tact  -  Sensory in tact in sural. Decreased sensation through Saphenous/tibial/superficial peroneal/deep peroneal nerve distributions but with full 5/5 PF/DF. Unable to extend knee or flex hip.  -  Calf soft/non-tender with no palpable cords        A/P: S/P HUANG 1 Day Post-Op RTHA    1. Weight Bearing: WBAT w/posterior hip precautions  2. Deep Venous thrombosis prophylaxis - Compression stocking/ASA  3. Continue Physical Therapy, Mobilize out of bed. Patient continues with RLE weakness secondary to spinal vs femoral nerve irritation. Continues to resolve. Caution with ambulation if knee continues to buckle  4. Pain control- modified as needed  5. Discharge Planning- pending clearance from Physical Therapy/Social work services. Plan for discharge to home with home health, likely tomorrow due to RLE weakness.     RICK Muse  Nurse Practitioner for Dr. Rogelio Calloway  P: [phone]  C: [phone]  F:  [phone]

## 2025-04-09 NOTE — PHYSICAL THERAPY NOTE
Physical Therapy Contact Note    Orders received and chart reviewed. Attempted to see patient for Physical Therapy services. Per RN, patient hypotensive and receiving IV bolus. Will re-schedule visit.    Katia Cowan, PT, DPT

## 2025-04-09 NOTE — PROGRESS NOTES
Face to Face Encounter    I (or a non-physician practitioner working in collaboration with me) had a face to face encounter with this patient during which a medical condition was addressed which is the primary reason for home health care on : 4/9/2025    The encounter was in whole, or in part, for the following medical conditions which is the primary reason for home care. Joint replacement surgery    Based on my findings, the following services are medically necessary home health services (Check all that apply): Nursing, because vital signs monitoring, PT/ INR monitoring and assessment for signs and symptoms of bleeding (if pt on coumadin), wound/ incision assessment, pain assessment and instruction related to pain management, and Physical Therapy, because evaluation of environment for safety (pt is at fall risk), gait training and instruction in the use of assistive devices, instruction and monitoring of therapeutic exercise.    The following clinical findings and patient's condition support that this patient is homebound, because narcotic usage every 4-6 hours, inability to ambulate greater than 150 feet, inability to drive a vehicle, fatigue due to anemia, increased risk for infection and increased risk for bleeding.    Certification for Home Health Services  Based on the above findings, I certify that this patient is confined to the home (meets homebound criteria listed above) and needs intermittent skilled nursing care, physical therapy and /or speech therapy or continues to need occupational therapy.  The patient is under my care, and I have initiated the establishment of the plan of care. This patient will be followed by a physician who will periodically review the plan of care.     Reina Mendoza  Nurse Practitioner for Dr. Rogelio Calloway  P: [phone]  F: [phone]

## 2025-04-09 NOTE — OCCUPATIONAL THERAPY NOTE
OCCUPATIONAL THERAPY EVALUATION - INPATIENT     Room Number: Room 8/Room 8-A  Evaluation Date: 4/9/2025  Type of Evaluation: Initial       Physician Order: IP Consult to Occupational Therapy  Reason for Therapy: ADL/IADL Dysfunction and Discharge Planning    OCCUPATIONAL THERAPY ASSESSMENT   RN cleared pt for participation in OT session, which was completed in collaboration with PT. Upon arrival, pt was supine in bed and agreeable to activity. No visitors present during session. Pt was left in bed and alarm was activated. Call light and all needs left in reach. Handoff given to RN.    Patient is a 69 year old female admitted 4/8/2025 for R HUANG; posterior hip precautions.  Prior to admission, pt was independent.  Patient is currently requiring up to max A for ADLs overall along with min A for supine to sit, CGA for sitting at EOB, mod A for STS, and mod A for SPT/squat pivot to/from RC. Pt tolerated about 1 minute of standing with R knee block. Pt has the following impairments: decreased sensation of RLE as well as strength and AROM; posterior hip precautions, weakness.       Education provided  Educated pt about role of OT and hospital therapy process as well as proper safety techniques including proper hand placement, body mechanics, safety techniques, posterior hip precautions, mini squats/increased weightbearing through RLE. Pt/family verbalized/demonstrated proper carryover.    Patient will benefit from continued skilled OT Services to facilitate return to prior level of function as patient demonstrates high motivation with excellent tolerance to an intensive therapy program vs home health pending progress.    PLAN  OT Treatment Plan: Balance activities;Energy conservation/work simplification techniques;Continued evaluation;Compensatory technique education;Fine motor coordination activities;Neuromuscluar reeducation;Equipment eval/education;Patient/Family training;Patient/Family education;Cognitive  reorientation;Endurance training;UE strengthening/ROM;Functional transfer training;Visual perceptual training;IADL training;ADL training      OCCUPATIONAL THERAPY MEDICAL/SOCIAL HISTORY     Problem List  Principal Problem:    Primary osteoarthritis of right hip  Active Problems:    Bipolar affective disorder (HCC)    Hyperlipidemia    CKD (chronic kidney disease) stage 3, GFR 30-59 ml/min ([facility])    S/P hip replacement, right      Past Medical History  Past Medical History[1]    Past Surgical History  Past Surgical History[2]    HOME SITUATION  Type of Home: House  Home Layout: Multi-level  Lives With: Spouse                         Patient Regularly Uses: Rolling walker, Cane    SUBJECTIVE  \"I have to go to the bathroom.\"    OCCUPATIONAL THERAPY EXAMINATION      OBJECTIVE  Precautions: HUANG - posterior, Hip abduction pillow  Fall Risk: High fall risk    PAIN ASSESSMENT  Rating: Unable to rate  Location: R hip  Management Techniques: Ice      RANGE OF MOTION   Upper extremity ROM is within functional limits     STRENGTH ASSESSMENT  Upper extremity strength is within functional limits     COORDINATION  Gross Motor: WFL   Fine Motor: WFL     ACTIVITIES OF DAILY LIVING ASSESSMENT  AM-PAC ‘6-Clicks’ Inpatient Daily Activity Short Form  How much help from another person does the patient currently need…  -   Putting on and taking off regular lower body clothing?: Total  -   Bathing (including washing, rinsing, drying)?: A Little  -   Toileting, which includes using toilet, bedpan or urinal? : A Lot  -   Putting on and taking off regular upper body clothing?: A Little  -   Taking care of personal grooming such as brushing teeth?: A Little  -   Eating meals?: None    AM-PAC Score:  Score: 16  Approx Degree of Impairment: 53.32%  Standardized Score (AM-PAC Scale): 35.96  CMS Modifier (G-Code): CK       FUNCTIONAL TRANSFER ASSESSMENT  Supine to Sit : Minimal Assist   Supine to sit: min A  Commode Transfer: mod A for SPT  STS:  mod A with R knee block    FUNCTIONAL ADL ASSESSMENT  UB ADLs: setup  LB ADLs: max A due to posterior hip precautions; pt owns reacher, sock aid etc  Toileting: mod A    The patient's Approx Degree of Impairment: 53.32% has been calculated based on documentation in the [facility] '6 clicks' Inpatient Daily Activity Short Form.  Research supports that patients with this level of impairment may benefit from IR. Final disposition will be made by interdisciplinary medical team.    OT Goals  Patients self stated goal is: to go home     Patient will complete functional transfer with SBA  Comment:     Patient will complete toileting with SBA  Comment:     Patient will tolerate standing for 3 minutes in prep for adls with SBA   Comment:    Patient will complete item retrieval with SBA  Comment:          Goals  on:   Frequency: 3-5x/wk    Patient Evaluation Complexity Level:   Occupational Profile/Medical History MODERATE - Expanded review of history including review of medical or therapy record   Specific performance deficits impacting engagement in ADL/IADL MODERATE  3 - 5 performance deficits   Client Assessment/Performance Deficits MODERATE - Comorbidities and min to mod modifications of tasks    Clinical Decision Making MODERATE - Analysis of occupational profile, detailed assessments, several treatment options    Overall Complexity MODERATE     OT Session Time: 20 minutes  Self-Care Home Management: 10 minutes           Ana Lloyd OT  [facility]  Inpatient Rehabilitation  Occupational Therapy  [phone]         [1]   Past Medical History:   Adrenal cyst (HCC)    Bipolar affective (HCC)    GERD (gastroesophageal reflux disease)    Hiatal hernia    7cm    HLD (hyperlipidemia)    Osteoarthritis    Osteopenia of lumbar spine    Overactive bladder    PONV (postoperative nausea and vomiting)    Renal disorder    Kidney function acceted by Lithium medication    Stage 3 chronic kidney disease (HCC)     Visual impairment    reading and driving wears glasses    Zenker diverticulum   [2]   Past Surgical History:  Procedure Laterality Date    Colonoscopy      Upper gi endoscopy,exam

## 2025-04-09 NOTE — CM/SW NOTE
Pt lives at home w/spouse who will provide transport at [place]. [facility] reserved for HH services at [place]. Signed APN order uploaded in [facility]. Pt will likely be held until Thursday.    Plan: Home w/spouse with [facility] HH pending medical clearance.    Errol Paniagua, SIRISHAN    [phone]

## 2025-04-10 ENCOUNTER — APPOINTMENT (OUTPATIENT)
Dept: MRI IMAGING | Facility: HOSPITAL | Age: 70
End: 2025-04-10
Attending: Other
Payer: COMMERCIAL

## 2025-04-10 PROBLEM — R29.898 RIGHT LEG WEAKNESS: Status: ACTIVE | Noted: 2025-04-10

## 2025-04-10 PROBLEM — Z96.641 PRESENCE OF ARTIFICIAL HIP, RIGHT: Status: ACTIVE | Noted: 2025-04-08

## 2025-04-10 LAB
ANION GAP SERPL CALC-SCNC: 7 MMOL/L (ref 0–18)
BASOPHILS # BLD AUTO: 0.03 X10(3) UL (ref 0–0.2)
BASOPHILS NFR BLD AUTO: 0.2 %
BUN BLD-MCNC: 23 MG/DL (ref 9–23)
BUN/CREAT SERPL: 16.7 (ref 10–20)
CALCIUM BLD-MCNC: 8.7 MG/DL (ref 8.7–10.4)
CHLORIDE SERPL-SCNC: 111 MMOL/L (ref 98–112)
CO2 SERPL-SCNC: 25 MMOL/L (ref 21–32)
CREAT BLD-MCNC: 1.38 MG/DL (ref 0.55–1.02)
DEPRECATED RDW RBC AUTO: 47.8 FL (ref 35.1–46.3)
EGFRCR SERPLBLD CKD-EPI 2021: 41 ML/MIN/1.73M2 (ref 60–?)
EOSINOPHIL # BLD AUTO: 0.06 X10(3) UL (ref 0–0.7)
EOSINOPHIL NFR BLD AUTO: 0.4 %
ERYTHROCYTE [DISTWIDTH] IN BLOOD BY AUTOMATED COUNT: 13.7 % (ref 11–15)
GLUCOSE BLD-MCNC: 73 MG/DL (ref 70–99)
HCT VFR BLD AUTO: 32.2 % (ref 35–48)
HGB BLD-MCNC: 10.8 G/DL (ref 12–16)
IMM GRANULOCYTES # BLD AUTO: 0.12 X10(3) UL (ref 0–1)
IMM GRANULOCYTES NFR BLD: 0.8 %
LYMPHOCYTES # BLD AUTO: 0.92 X10(3) UL (ref 1–4)
LYMPHOCYTES NFR BLD AUTO: 5.9 %
MCH RBC QN AUTO: 31.9 PG (ref 26–34)
MCHC RBC AUTO-ENTMCNC: 33.5 G/DL (ref 31–37)
MCV RBC AUTO: 95 FL (ref 80–100)
MONOCYTES # BLD AUTO: 1.4 X10(3) UL (ref 0.1–1)
MONOCYTES NFR BLD AUTO: 8.9 %
NEUTROPHILS # BLD AUTO: 13.13 X10 (3) UL (ref 1.5–7.7)
NEUTROPHILS # BLD AUTO: 13.13 X10(3) UL (ref 1.5–7.7)
NEUTROPHILS NFR BLD AUTO: 83.8 %
OSMOLALITY SERPL CALC.SUM OF ELEC: 298 MOSM/KG (ref 275–295)
PLATELET # BLD AUTO: 171 10(3)UL (ref 150–450)
POTASSIUM SERPL-SCNC: 4.5 MMOL/L (ref 3.5–5.1)
RBC # BLD AUTO: 3.39 X10(6)UL (ref 3.8–5.3)
SODIUM SERPL-SCNC: 143 MMOL/L (ref 136–145)
WBC # BLD AUTO: 15.7 X10(3) UL (ref 4–11)

## 2025-04-10 PROCEDURE — 99214 OFFICE O/P EST MOD 30 MIN: CPT | Performed by: HOSPITALIST

## 2025-04-10 PROCEDURE — 99223 1ST HOSP IP/OBS HIGH 75: CPT | Performed by: OTHER

## 2025-04-10 PROCEDURE — 72148 MRI LUMBAR SPINE W/O DYE: CPT | Performed by: OTHER

## 2025-04-10 PROCEDURE — 72158 MRI LUMBAR SPINE W/O & W/DYE: CPT | Performed by: OTHER

## 2025-04-10 RX ORDER — GADOTERATE MEGLUMINE 376.9 MG/ML
15 INJECTION INTRAVENOUS
Status: COMPLETED | OUTPATIENT
Start: 2025-04-10 | End: 2025-04-10

## 2025-04-10 NOTE — PLAN OF CARE
Patient alert and oriented. Dressing in palce to right hip, POD2. Pt reports decreased sensation to medial aspect of right leg, MD aware. Buckling noted upon ambulation, pt unable to bear weight on RLE. Neuro consulted, MRI ordered. Ambulating standby with walker. Scheduled tylenol and tramadol for pain. Knee immobilizer at bedside for ambulation for buckling.    Plan is MRI.

## 2025-04-10 NOTE — PHYSICAL THERAPY NOTE
PHYSICAL THERAPY HIP TREATMENT NOTE - INPATIENT    Room Number: Room 8/Room 8-A       Presenting Problem: s/p R HUANG  Co-Morbidities : Adrenal cyst (HCC)    Bipolar affective (HCC)   GERD (gastroesophageal reflux disease)   Hiatal hernia    7cm   HLD (hyperlipidemia)   Osteoarthritis   Osteopenia of lumbar spine   Overactive bladder   PONV (postoperative nausea and vomiting)   Stage 3 chronic kidney disease (HCC)   Visual impairment    reading and driving wears glasses   Zenker diverticulum    Problem List  Principal Problem:    Primary osteoarthritis of right hip  Active Problems:    Bipolar affective disorder (HCC)    Hyperlipidemia    CKD (chronic kidney disease) stage 3, GFR 30-59 ml/min (HCC)    Presence of artificial hip, right    PHYSICAL THERAPY ASSESSMENT   Patient demonstrates good  progress this session, goals  remain in progress.      Patient is requiring minimal assist as a result of the following impairments: decreased functional strength, decreased endurance/aerobic capacity, pain, impaired standing balance, decreased muscular endurance, and medical status.     Patient continues to function below baseline with bed mobility, transfers, gait, stair negotiation, maintaining seated position, standing prolonged periods, and performing household tasks.  Next session anticipate patient to progress bed mobility, transfers, gait, stair negotiation, maintaining seated position, standing prolonged periods, and performing household tasks.  Physical Therapy will continue to follow patient for duration of hospitalization.    Patient continues to benefit from continued skilled PT services: to facilitate return to prior level of function as patient demonstrates high motivation with excellent tolerance to an intensive therapy program  (versus return home pending progress/improvement in impaired RLE sensation)     PLAN DURING HOSPITALIZATION  Nursing Mobility Recommendation : 1 Assist     PT Treatment Plan: Bed  mobility, Body mechanics, Coordination, Endurance, Energy conservation, Gait training, Strengthening, Stair training, Transfer training, Balance training  Frequency (Obs): Daily     SUBJECTIVE  \"I am trying to stay positive\"    OBJECTIVE  Precautions: HUANG - posterior, Hip abduction pillow    WEIGHT BEARING RESTRICTION  R Lower Extremity: Weight Bearing as Tolerated    PAIN ASSESSMENT   Rating: Unable to rate  Location: RLE  Management Techniques: Activity promotion, Breathing techniques, Body mechanics, Relaxation, Repositioning    BALANCE  Static Sitting: Good  Dynamic Sitting: Good  Static Standing: Fair  Dynamic Standing: Poor  ACTIVITY TOLERANCE  Pulse: 85  Heart Rate Source: Monitor     BP: 129/82  BP Location: Right arm  BP Method: Automatic  Patient Position: Sitting    O2 WALK  Oxygen Therapy  SPO2% on Room Air at Rest: 99    AM-PAC '6-Clicks' INPATIENT SHORT FORM - BASIC MOBILITY  How much difficulty does the patient currently have...  Patient Difficulty: Turning over in bed (including adjusting bedclothes, sheets and blankets)?: A Little   Patient Difficulty: Sitting down on and standing up from a chair with arms (e.g., wheelchair, bedside commode, etc.): A Little   Patient Difficulty: Moving from lying on back to sitting on the side of the bed?: A Little   How much help from another person does the patient currently need...   Help from Another: Moving to and from a bed to a chair (including a wheelchair)?: A Little   Help from Another: Need to walk in hospital room?: A Little   Help from Another: Climbing 3-5 steps with a railing?: A Lot     AM-PAC Score:  Raw Score: 17   Approx Degree of Impairment: 50.57%   Standardized Score (AM-PAC Scale): 42.13   CMS Modifier (G-Code): CK    FUNCTIONAL ABILITY STATUS  Functional Mobility/Gait Assessment  Gait Assistance: Minimum assistance (with a chair follow)  Distance (ft): ~10 feet  Assistive Device: Rolling walker  Pattern:  (heavy UE reliance on rolling walker,  decreased nathaniel, decreased step length, forward flexed posture, narrow base of support, verbal cues for sequencing and rolling walker management.)  Rolling:  not tested  Supine to Sit:  not tested (patient up in chair at start/end of session)  Sit to Supine:  not tested  Sit to Stand: contact guard assist  Bed to Chair: contact guard assist    Skilled Therapy Provided: Patient received seated in recliner chair at initiation of session agreeable to participation in PT. Patient tolerates treatment fairly, demonstrates improvement in RLE sensation and strength in comparison to previous session, however it continues to limit independence with mobility. Patient able to perform sit to stand and pivot transfer to rolling chair with contact guard assist. Patient able to perform static standing with and emphasis on quad activation in weight bearing. Patient ambulates ~10 feet with minimal assistance and a close chair follow, no overt loss of balance noted. Patient left in bedside chair, lines intact, needs in reach and handoff to RN.    The patient's Approx Degree of Impairment: 50.57% has been calculated based on documentation in the [facility] '6 clicks' Inpatient Basic Mobility Short Form.  Research supports that patients with this level of impairment may benefit from IR versus home pending progress.  Final disposition will be made by interdisciplinary medical team.    Exercises AM Session   Ankle Pumps     15 reps   Quad Sets 10 reps   Sitting Knee Extension 15 reps   Hamstring Curls 5 reps     Patient End of Session: Up in chair, Needs met, Call light within reach, RN aware of session/findings, All patient questions and concerns addressed, Hospital anti-slip socks, Alarm set    CURRENT GOALS   Goals to be met by: 4/23/25  Patient Goal Patient's self-stated goal is: to return home   Goal #1 Patient is able to demonstrate supine - sit EOB @ level: modified independent   Goal #1   Current Status  Progressing   Goal #2 Patient  is able to demonstrate transfers Sit to/from Stand at assistance level: modified independent      Goal #2  Current Status  Progressing   Goal #3 Patient is able to ambulate 300 feet with assistive device at assistance level: modified independent    Goal #3   Current Status  Progressing   Goal #4 Patient will negotiate 8 stairs/one curb w/ assistive device and supervision   Goal #4   Current Status  Progressing   Goal #5 Patient verbalizes and/or demonstrates all precautions and safety concerns independently   Goal #5   Current Status  Progressing   Goal #6 Patient independently performs home exercise program for ROM/strengthening per the instructions provided in preparation for discharge.   Goal #6  Current Status  Progressing     Gait Training: 10 minutes  Therapeutic Activity: 15 minutes    Katia Cowan PT, DPT

## 2025-04-10 NOTE — PROGRESS NOTES
Spoke with patient on the phone today, was seen by ortho this morning. States that although she feels some improvement in numbness, she is still unable to bear weight through the RLE without buckling. Has full sensation and motor function to foot. Decreased sensation medial thigh and medial lower leg. Unable to extend knee or flex hip.   Again discussed this could be femoral nerve related and is likely to continue to improve. Recommending knee immobilizer for now. Case discussed with Dr. Calloway and will request consult from neurology. Patient and  in agreement.     RICK Muse  Nurse Practitioner for Dr. Rogelio Calloway  P: [phone]  C: [phone]  F: [phone]

## 2025-04-10 NOTE — PROGRESS NOTES
4/10/2025  Admission date 4/8/2025      S: Resting comfortably. BP improved. Continues to have numbness in medial thigh/leg and weakness flexing hip/extending knee but normal sensation and motor in foot. Otherwise patient doing well. Denies any numbness/tingling, F/C/S, N/V/D, SOB, Chest Pain, Abdominal Pain. Pain well-controlled.     O:    Vitals:    04/10/25 0502   BP: 123/73   Pulse: 80   Resp: 18   Temp: 98 °F (36.7 °C)       Data Review: {  No results found for this or any previous visit (from the past 24 hours).    Gen:  A&O x 3, VSS, Afebrile    Lower Extremity:   -  Dressing clean, dry, intact  -  2+ Pedal pulses Bilaterally  -  Tibialis Anterior/Gastroc and Soleus/ Extensor Halluses Longus motor in tact  -  Sensory in tact in sural/Saphenous/tibial/superficial peroneal/deep peroneal nerve distributions but with full 5/5 PF/DF. Unable to extend knee or flex hip. Continued decreased sensation medial thigh and medial lower leg.  -  Calf soft/non-tender with no palpable cords        A/P: S/P HUANG 2 Days Post-Op RTHA    1. Weight Bearing: WBAT w/posterior hip precautions  2. Deep Venous thrombosis prophylaxis - Compression stocking/ASA  3. Continue Physical Therapy, Mobilize out of bed. Patient continues with RLE weakness secondary to spinal vs femoral nerve irritation. Continues to resolve. Caution with ambulation if knee continues to buckle, consider knee immobilizer today to assist with ambulation  4. Pain control- modified as needed  5. Discharge Planning- pending clearance from Physical Therapy/Social work services. Plan for discharge to home with home health, likely today vs tomorrow due to RLE weakness.     Valerio Rodriges MD  Adult Reconstruction Fellow  [facility]

## 2025-04-10 NOTE — CONSULTS
[facility]  [address] [place] [address], [address]  [facility] [address]  [phone]          NEUROLOGY CONSULTATION NOTE    [facility]  part of [facility]    Report of Consultation  Kerline Vizcaino Patient Status:  Outpatient in a Bed     1955 MRN [id number]    Location [facility] Attending Messi Duggan MD    Hosp Day # 0 PCP GABBY GARCIA      Date of Admission:  2025  Date of Consult:  4/10/2025  Reason for Admission/Consultation:  R LE weakness s/p R HUANG, suspect femoral neuropathy     Requested by: Messi Duggan MD  _________________________________________________________________________________________  Chief Complaint: No chief complaint on file.    HPI:  Kerline Vizcaino is a 69 year old  female w/ a pmhx sig. for bipolar, CKD, who is being seen for right leg weakness after a right total hip arthroplasty.  Weakness has been improving.    Reports that she cannot walk without the walker.  States that there is been improvement because her leg is no longer buckling.  Reports that yesterday right leg was buckling under while walking.  Also reports that yesterday she can plantar dorsiflex her right foot which is also improved.  She reported that her right leg was numb and involve the medial aspect of her right leg.  She denied any pain.  No low back pain or pain that radiated down the back of her leg at all.  No radicular symptoms.    Review and summation of prior records      ROS:  Pertinent positive and negatives per HPI.  All others were reviewed and negative.    Past Medical History[1]    Past Surgical History[2]    Family History[3]    Social History     Socioeconomic History    Marital status:      Spouse name: Not on file    Number of children: Not on file    Years of education: Not on file    Highest education level: Not on file   Occupational History    Not on file   Tobacco Use    Smoking status: Never     Passive exposure:  Past    Smokeless tobacco: Never   Vaping Use    Vaping status: Never Used   Substance and Sexual Activity    Alcohol use: Yes     Comment: 3 drinks on weekends only    Drug use: Never    Sexual activity: Not on file   Other Topics Concern    Not on file   Social History Narrative    Not on file     Social Drivers of Health     Food Insecurity: No Food Insecurity (5/28/2024)    Received from [facility]    Hunger Vital Sign     Worried About Running Out of Food in the Last Year: Never true     Ran Out of Food in the Last Year: Never true   Transportation Needs: No Transportation Needs (5/28/2024)    Received from [facility]    PRAPARE - Transportation     Lack of Transportation (Medical): No     Lack of Transportation (Non-Medical): No   Stress: Not on file   Housing Stability: Low Risk  (5/28/2024)    Received from [facility]    Housing Stability Vital Sign     Unable to Pay for Housing in the Last Year: No     Number of Places Lived in the Last Year: 1     Unstable Housing in the Last Year: No       Objective:    Last vitals and weight :  Vitals:    04/10/25 0953   BP: 129/82   Pulse: 85   Resp:    Temp:      @FLOWCHS(14)@    Exam:  Physical Exam:  General:alert, appears stated age, and cooperative  HEENT: MMM. .  Neck is supple. Trachea midline.   CV: symmetric pulses nml S1 S2   Pulm: CTAB  Neurologic Exam  - Mental Status: Alert and interactive. Oriented to person, place, time, and purpose of visit. Funds of knowledge are good. Speech is spontaneous, fluent, and prosodic. Comprehension and repetition intact. Phrase length and rate are normal. No: paraphasic errors, neologisms, anomia, acalculia, apraxia, anosognosia, or R/L confusion.   - Cranial Nerves: No gaze preference. VF intact B/L. Pupils are 3mm briskly constricting to 2mm and equally round and reactive to light and accommodation in a well lit room. No rAPD EOMI. No nystagmus. No ptosis.  V1-V3 intact B/L to light touch. Hearing grossly intact. Symmetric: brow furrow, cheek puff, and smile.  No flattening of the nasolabial fold. Tongue midline. No atrophy or fasiculations of the tongue noted. Palate and uvula elevate symmetrically.  Shoulder shrug symmetric.  No spastic dysarthric. Voice is not nasal in quality.      - Motor:    normal tone/bulk  She could not flex her right leg at the knee.  She reports that she had to use her heel against a surface of the bed to keep it propped up.  Right leg is internally rotated when she tries to flex it.   Right Left     Motor Strength   Deltoids 5 5  Triceps 5 5  Biceps 5 5  Wrist extension:     5 5     Thigh Adductors 5 5  Thigh Abductors  2 5  Hip Flexors 0 5  Hip Extensors    Knee Flexors 5 5  Knee Extensor 1 5  Foot Plantar Flexors 5 5  Foot Dorsi Flexors 5 5  Foot external rotation 4 4+  Foot internal rotation 5 5  EHL 5 5  FHL 5 5   Pronator Drift: No pronator drift   Pseudoathetosis: absent     Asterixis: absent     Tremor:  absent        Reflexes:    C5 C6 C7  L4 S1   R 2+ 2+ 2+ 1+ 2+   L 2+ 2+ 2+ 3+ 2+   ? Crossed adductor  when testing her RIGHT patellar reflex.    Jaw Jerk:    Chris's sign: absent   Nonsustained clonus:    Sustained clonus:     - Sensory:  Light touch: Intact.  Vibration: Decreased over her right patella compared to the left.  Proprioception:   Temperature: Reports increased temperature sensation on the medial aspect of her right leg.  Pinprick: + Allodynia.  She reports increased pain to pinprick on the medial aspect of her left leg.  Pinprick is intact in the first webspace of each toe bilaterally.     However, on the medial aspect of her leg she reports allodynia.  Describes it as \"a bee sting.\"  Involves medial aspect of her leg to her medial malleolus.     - Cerebellum: No truncal ataxia. No titubations. No dysmetria, no dysdiadochokinesis. No overshoot.          Inpatient Medications  Scheduled Medications[4]     PRN  Medications[5]      Home Medications  Current Outpatient Medications   Medication Instructions    acetaminophen (TYLENOL) 650 mg, Every 8 hours scheduled    ALPRAZolam (XANAX) 0.25 mg, Nightly PRN    aspirin 325 mg, 2 times daily    celecoxib (CELEBREX) 200 mg, DAILY PRN    Chelated Magnesium 100 MG Oral Tab Daily    Cholecalciferol (VITAMIN D3) 25 MCG (1000 UT) Oral Cap 1 tablet, Daily    cyanocobalamin (VITAMIN B12) 1,000 mcg, Daily    Esomeprazole Magnesium (NEXIUM) 20 mg, Every morning before breakfast    Estradiol 10 mcg, Twice weekly    Glucosamine HCl-Glucosamin SO4 (GLUCOSAMINE COMPLEX OR) Daily    lithium carbonate 300 mg, Every morning    Naloxone HCl 4 mg, Nasal, As needed, If patient remains unresponsive, repeat dose in other nostril 2-5 minutes after first dose.    nitrofurantoin monohydrate macro (MACROBID) 100 mg, Once as needed    oxyCODONE 5 mg, Every 4 hours PRN    pantoprazole (PROTONIX) 40 mg, Every morning before breakfast    psyllium Oral Powd Pack 1 packet, 2 times daily PRN    rosuvastatin (CRESTOR) 10 mg, Every morning    Sennosides 17.2 mg, 2 times daily    traMADol (ULTRAM) 50 mg, Every 6 hours scheduled    TURMERIC-GINGER OR 1 capsule, Daily        Data reviewed  Laboratory Data:  Lab Results   Component Value Date    WBC 15.7 (H) 04/10/2025    HGB 10.8 (L) 04/10/2025    HCT 32.2 (L) 04/10/2025    .0 04/10/2025    CREATSERUM 1.38 (H) 04/10/2025    BUN 23 04/10/2025     04/10/2025    K 4.5 04/10/2025     04/10/2025    CO2 25.0 04/10/2025    GLU 73 04/10/2025    CA 8.7 04/10/2025    ALB 4.4 03/21/2025    ALKPHO 50 (L) 03/21/2025    TP 6.9 03/21/2025    AST 29 03/21/2025    ALT 16 03/21/2025     Recent Results (from the past 72 hours)   ABORH Confirmation    Collection Time: 04/08/25  8:37 AM   Result Value Ref Range    ABO BLOOD TYPE O     RH BLOOD TYPE Positive    Specimen to Pathology Tissue    Collection Time: 04/08/25 11:01 AM   Result Value Ref Range    Case Report        Surgical Pathology                                Case: [id number]                                  Authorizing Provider:  Rogelio Calloway MD     Collected:           04/08/2025 11:01 AM          Ordering Location:     [facility]          Received:            04/08/2025 12:57 PM                                 Operating Room                                                               Pathologist:           Marvin Robles MD                                                                  Specimen:    Hip, right, 1. Right femoral head bone and tissue                                          Final Diagnosis:         Right hip bone and tissue; total hip arthroplasty:   Bone and articular cartilage with degenerative changes, consistent with osteoarthritis  Fibroconnective tissue and synovium with vascular congestion and mild reactive fibrosis        Embedded Images      Clinical Information       Degenerative Joint Disease, Right Hip.        Gross Description       The specimen is received in formalin labeled \"Vizcaino, right femoral head bone and tissue\" and consists of a femoral head with femoral neck and a smooth bone margin.  The specimen measures 5.0 x 4.6 x 4.3 cm.  Attached to the femoral neck are remnants of soft tissue.  The articular surfaces show moderate to severe erosion with eburnation of the underlying bone, and osteophytes around the periphery.  The specimen is sectioned to reveal yellow-tan, trabeculated bone with no lesions grossly identified. Representative sections submitted as follows: A1- soft tissue; A2-bone with articular surface for decalcification. (jhq)    Marvin Robles M.D.      Interpretation Benign     Hemoglobin & Hematocrit    Collection Time: 04/09/25  5:22 AM   Result Value Ref Range    HGB 10.5 (L) 12.0 - 16.0 g/dL    HCT 32.2 (L) 35.0 - 48.0 %   RAINBOW DRAW LIGHT GREEN    Collection Time: 04/09/25  5:22 AM   Result Value Ref Range    Hold Lt Green Auto Resulted    Basic Metabolic  Panel (8)    Collection Time: 04/10/25  6:15 AM   Result Value Ref Range    Glucose 73 70 - 99 mg/dL    Sodium 143 136 - 145 mmol/L    Potassium 4.5 3.5 - 5.1 mmol/L    Chloride 111 98 - 112 mmol/L    CO2 25.0 21.0 - 32.0 mmol/L    Anion Gap 7 0 - 18 mmol/L    BUN 23 9 - 23 mg/dL    Creatinine 1.38 (H) 0.55 - 1.02 mg/dL    BUN/CREA Ratio 16.7 10.0 - 20.0    Calcium, Total 8.7 8.7 - 10.4 mg/dL    Calculated Osmolality 298 (H) 275 - 295 mOsm/kg    eGFR-Cr 41 (L) >=60 mL/min/1.73m2   CBC With Differential With Platelet    Collection Time: 04/10/25  6:15 AM   Result Value Ref Range    WBC 15.7 (H) 4.0 - 11.0 x10(3) uL    RBC 3.39 (L) 3.80 - 5.30 x10(6)uL    HGB 10.8 (L) 12.0 - 16.0 g/dL    HCT 32.2 (L) 35.0 - 48.0 %    MCV 95.0 80.0 - 100.0 fL    MCH 31.9 26.0 - 34.0 pg    MCHC 33.5 31.0 - 37.0 g/dL    RDW-SD 47.8 (H) 35.1 - 46.3 fL    RDW 13.7 11.0 - 15.0 %    .0 150.0 - 450.0 10(3)uL    Neutrophil Absolute Prelim 13.13 (H) 1.50 - 7.70 x10 (3) uL    Neutrophil Absolute 13.13 (H) 1.50 - 7.70 x10(3) uL    Lymphocyte Absolute 0.92 (L) 1.00 - 4.00 x10(3) uL    Monocyte Absolute 1.40 (H) 0.10 - 1.00 x10(3) uL    Eosinophil Absolute 0.06 0.00 - 0.70 x10(3) uL    Basophil Absolute 0.03 0.00 - 0.20 x10(3) uL    Immature Granulocyte Absolute 0.12 0.00 - 1.00 x10(3) uL    Neutrophil % 83.8 %    Lymphocyte % 5.9 %    Monocyte % 8.9 %    Eosinophil % 0.4 %    Basophil % 0.2 %    Immature Granulocyte % 0.8 %        HGBA1C: No results found for: \"A1C\", \"EAG\"     Test results/Imaging:   No results found.      No results found.    Performed an independent visualization of   Imaging revealed:         Sensory changes (allodynia/hypoesthesia) involving the medial aspect of her leg would fit with the femoral nerve and its sensory branch the saphenous nerve.  Decreased patellar reflex can also be seen in femoral neuropathy.  However, she had weakness in her right thigh abductors, which would not fit with a femoral neuropathy.        Allodynia/hypoesthesia in the medial right leg, right hip flexor and knee extension weakness, right abduction weakness, decreased right patellar reflex  Differential Diagnosis:  Femoral neuropathy,  Less likely lumbar radiculopathy but will order MRI to evaluate.  Can also help evaluate for any causes of compression of the femoral nerve or involvement of the iliopsoas  Lumbosacral plexopathy less likely but could be responsible for her weakness in abduction  Diagnostics:  L-spine  MRI lumbosacral plexus  Therapeutics:  Continue PT OT.      Education/Instructions given to: patient   Barriers to Learning:None  Content: Refer to note above. Evaluation/Outcome: Verbalized understanding    This document is not intended to support charting by exception.  Sections left blank in a completed note should be presumed not to have been done.    Disclaimer:   This record was dictated using Dragon software. There may be errors due to voice recognition problems that were not realized and corrected during the completion of the note.         Thank you.  Sheldon Somers D.O.   Vascular & General Neurology  4/10/2025  5:30 PM         [1]   Past Medical History:   Adrenal cyst (HCC)    Bipolar affective (HCC)    GERD (gastroesophageal reflux disease)    Hiatal hernia    7cm    HLD (hyperlipidemia)    Osteoarthritis    Osteopenia of lumbar spine    Overactive bladder    PONV (postoperative nausea and vomiting)    Renal disorder    Kidney function acceted by Lithium medication    Stage 3 chronic kidney disease (HCC)    Visual impairment    reading and driving wears glasses    Zenker diverticulum   [2]   Past Surgical History:  Procedure Laterality Date    Colonoscopy      Upper gi endoscopy,exam     [3]   Family History  Problem Relation Age of Onset    Other (aortic valve surgery) Mother     Bipolar Disorder Mother     Other (MI) Father     Other (osteoarthritis) Sister     Bipolar Disorder Maternal Grandmother    [4]    sennosides   17.2 mg Oral Nightly    docusate sodium  100 mg Oral BID    aspirin  325 mg Oral BID    acetaminophen  650 mg Oral Q4H While awake    traMADol  50 mg Oral 4 times per day    pantoprazole  40 mg Oral QAM AC    lithium ER  300 mg Oral QAM    rosuvastatin  10 mg Oral QAM   [5]   polyethylene glycol (PEG 3350)    magnesium hydroxide    bisacodyl    fleet enema    ondansetron    metoclopramide    diphenhydrAMINE **OR** diphenhydrAMINE    oxyCODONE **OR** oxyCODONE    HYDROmorphone **OR** HYDROmorphone    ALPRAZolam

## 2025-04-10 NOTE — PROGRESS NOTES
[facility]  part of [facility]    Progress Note    Kerline Vizcaino Patient Status:  Outpatient in a Bed    1955 MRN [id number]   Location [facility] Attending Messi Duggan MD   Hosp Day # 0 PCP GABBY GARCIA       Subjective:     Pt still with right leg weakness.  Pt cannot put weight on the right leg.    Objective:   Blood pressure 123/73, pulse 80, temperature 98.4 °F (36.9 °C), temperature source Temporal, resp. rate 18, height 62\", weight 148 lb (67.1 kg), SpO2 94%.    Gen:   NAD.  A and O x 3  CV:   RRR, no m/g/r  Pulm:   CTA bilat  Abd:   +bs, soft, NT, ND  LE:   No c/c/e  Right hip wound c/d/i  Neuro:   weakness of right leg, otherwise nonfocal    Results:     Lab Results   Component Value Date    WBC 15.7 (H) 04/10/2025    HGB 10.8 (L) 04/10/2025    HCT 32.2 (L) 04/10/2025    .0 04/10/2025    CREATSERUM 1.38 (H) 04/10/2025    BUN 23 04/10/2025     04/10/2025    K 4.5 04/10/2025     04/10/2025    CO2 25.0 04/10/2025    GLU 73 04/10/2025    CA 8.7 04/10/2025    ALB 4.4 2025    ALKPHO 50 (L) 2025    BILT 0.5 2025    TP 6.9 2025    AST 29 2025    ALT 16 2025       XR HIP W OR WO PELVIS 1 VIEW, RIGHT (CPT=73501)  Result Date: 2025  CONCLUSION: Status post total right hip arthroplasty.    Dictated by (CST): Alejandro Lui MD on 2025 at 1:50 PM     Finalized by (CST): Alejandro Lui MD on 2025 at 1:50 PM                Assessment and Plan:     1.       Right hip primary osteoarthritis  POD #2 s/p right HUANG  Continued new right leg weakness   - pain control  - PT//OT  - aspirin for dvt proph  Pt may need rehab.     2.       Bipolar affective disorder.  Continue home medications.   3.       Hyperlipidemia.  Continue home medications.   4.       Chronic kidney disease stage 3.  Continue to monitor postoperatively.    MDM:    High Messi Tejada MD  4/10/2025

## 2025-04-11 PROCEDURE — 99233 SBSQ HOSP IP/OBS HIGH 50: CPT | Performed by: OTHER

## 2025-04-11 PROCEDURE — 99214 OFFICE O/P EST MOD 30 MIN: CPT | Performed by: HOSPITALIST

## 2025-04-11 NOTE — CM/SW NOTE
Department  asked to send updates to [facility] referral for HHC.    Assigned SW/CM to follow up with patient/family on discharge plan.   Marcy Roland, DSC

## 2025-04-11 NOTE — PROGRESS NOTES
[facility]  [address] [place] [address], [address]  [facility] [address]  [phone]        INPATIENT NEUROLOGY   FOLLOW UP PROGRESS NOTE  [facility]  part of [facility]    Kerline Vizcaino Patient Status:  Outpatient in a Bed     1955 MRN [id number]    Location [facility] 4W/SW/SE Attending Messi Duggan MD    Hosp Day # 0 PCP GABBY GARCIA    Date of Admission:  2025  Date of Consult Follow Up:  2025     Assessment and Plan:   Kerline Vizcaino is a 69 year old  Kerline Vizcaino is a 69 year old  female w/ a pmhx sig. for bipolar, CKD, who is being seen for right leg weakness after a right total hip arthroplasty.  Weakness has been improving.  On repeat exam on 2022 she still has weakness in her hip flexors and knee extensors.  She also still has weakness in her right  Thigh abductors.    Overall suspect that she may have a right lumbar plexopathy involving both the femoral nerve and the superior gluteal nerve rather than an isolated femoral neuropathy. Her MRI of the lumbosacral plexus does not have signs of injury/trauma.  Stretch, compression ,retraction near the pelvic brim could cause her sx.    Recommend aggressive PT and outpt EMG.     Further management per orthopaedic surgery.         Allodynia/hypoesthesia in the medial right leg, right hip flexor and knee extension weakness, right abduction weakness, decreased right patellar reflex   Differential Diagnosis:   Superior lumbosacral plexopathy       Plan    Diagnostics:  Mris completed  Outpt EMG if sx don't improve   Therapeutics:  PT/OT/  Neurochecks Q4    1. Presence of artificial hip, right  - oxyCODONE 5 MG Oral Tab; Take 1 tablet (5 mg total) by mouth every 4 (four) hours as needed.  - traMADol 50 MG Oral Tab; Take 1 tablet (50 mg total) by mouth every 6 (six) hours.         Procedures    Hemoglobin & Hematocrit    Basic Metabolic Panel (8)    CBC With Differential With Platelet     XR HIP W OR WO PELVIS 1 VIEW, RIGHT (CPT=73501)    MRI SPINE LUMBAR (CPT=72148)    MRI LUMBAR PLEXUS (W+WO)(CPT=72158)           INTERVAL EVENTS  04/11/25:        SUBJECTIVE:     Reports sx are unchanged.   No new sx  Explained results of imaging.   Pertinent positive and negatives per HPI.  All others were reviewed and negative.       Objective   OBJECTIVE:   Last vitals and weight :  Blood pressure 123/77, pulse 76, temperature 97 °F (36.1 °C), temperature source Temporal, resp. rate 18, height 62\", weight 148 lb (67.1 kg), SpO2 96%.   Vitals:    04/10/25 0953 04/10/25 1742 04/10/25 2139 04/11/25 0918   BP: 129/82 125/77 150/85 123/77   BP Location: Right arm Right arm Right arm Right arm   Pulse: 85 80 87 76   Resp:  18 18 18   Temp:  98 °F (36.7 °C) 98.8 °F (37.1 °C) 97 °F (36.1 °C)   TempSrc:  Temporal Oral Temporal   SpO2:  97% 99% 96%   Weight:       Height:           Exam:  Physical Exam:  General:alert, appears stated age, and cooperative  HEENT: MMM. .  Neck is supple. Trachea midline.   CV: symmetric pulses nml S1 S2   Pulm: CTAB  Neurologic Exam  - Mental Status: Alert and interactive. Oriented to person, place, time, and purpose of visit. Funds of knowledge are good. Speech is spontaneous, fluent, and prosodic. Comprehension and repetition intact. Phrase length and rate are normal. No: paraphasic errors, neologisms, anomia, acalculia, apraxia, anosognosia, or R/L confusion.   - Cranial Nerves: No gaze preference. VF intact B/L. Pupils are 3mm briskly constricting to 2mm and equally round and reactive to light and accommodation in a well lit room. No rAPD EOMI. No nystagmus. No ptosis. V1-V3 intact B/L to light touch. Hearing grossly intact. Symmetric: brow furrow, cheek puff, and smile.  No flattening of the nasolabial fold. Tongue midline. No atrophy or fasiculations of the tongue noted. Palate and uvula elevate symmetrically.  Shoulder shrug symmetric.  No spastic dysarthric. Voice is not nasal in  quality.      - Motor:    normal tone/bulk  She could not flex her right leg at the knee.  She reports that she had to use her heel against a surface of the bed to keep it propped up.  Right leg is internally rotated when she tries to flex it.                                                        Right                  Left     Motor Strength                                 Thigh Adductors 5  5  Thigh Abductors            2                         5  Hip Flexors                      0                         5  Hip Extensors                                                          Pronator Drift: No pronator drift           Pseudoathetosis: absent             Asterixis: absent             Tremor:  absent                   - Sensory:  Light touch: Intact.       Medications:  Scheduled Medications[1]    PRNS: PRN Medications[2]    Infusions: Medication Infusions[3]         Results:   Laboratory Data:  Lab Results   Component Value Date    WBC 15.7 (H) 04/10/2025    HGB 10.8 (L) 04/10/2025    HCT 32.2 (L) 04/10/2025    .0 04/10/2025    CREATSERUM 1.38 (H) 04/10/2025    BUN 23 04/10/2025     04/10/2025    K 4.5 04/10/2025     04/10/2025    CO2 25.0 04/10/2025    GLU 73 04/10/2025    CA 8.7 04/10/2025    ALB 4.4 03/21/2025    ALKPHO 50 (L) 03/21/2025    TP 6.9 03/21/2025    AST 29 03/21/2025    ALT 16 03/21/2025     Recent Results (from the past 72 hours)   Hemoglobin & Hematocrit    Collection Time: 04/09/25  5:22 AM   Result Value Ref Range    HGB 10.5 (L) 12.0 - 16.0 g/dL    HCT 32.2 (L) 35.0 - 48.0 %   RAINBOW DRAW LIGHT GREEN    Collection Time: 04/09/25  5:22 AM   Result Value Ref Range    Hold Lt Green Auto Resulted    Basic Metabolic Panel (8)    Collection Time: 04/10/25  6:15 AM   Result Value Ref Range    Glucose 73 70 - 99 mg/dL    Sodium 143 136 - 145 mmol/L    Potassium 4.5 3.5 - 5.1 mmol/L    Chloride 111 98 - 112 mmol/L    CO2 25.0 21.0 - 32.0 mmol/L    Anion Gap 7 0 - 18 mmol/L    BUN  23 9 - 23 mg/dL    Creatinine 1.38 (H) 0.55 - 1.02 mg/dL    BUN/CREA Ratio 16.7 10.0 - 20.0    Calcium, Total 8.7 8.7 - 10.4 mg/dL    Calculated Osmolality 298 (H) 275 - 295 mOsm/kg    eGFR-Cr 41 (L) >=60 mL/min/1.73m2   CBC With Differential With Platelet    Collection Time: 04/10/25  6:15 AM   Result Value Ref Range    WBC 15.7 (H) 4.0 - 11.0 x10(3) uL    RBC 3.39 (L) 3.80 - 5.30 x10(6)uL    HGB 10.8 (L) 12.0 - 16.0 g/dL    HCT 32.2 (L) 35.0 - 48.0 %    MCV 95.0 80.0 - 100.0 fL    MCH 31.9 26.0 - 34.0 pg    MCHC 33.5 31.0 - 37.0 g/dL    RDW-SD 47.8 (H) 35.1 - 46.3 fL    RDW 13.7 11.0 - 15.0 %    .0 150.0 - 450.0 10(3)uL    Neutrophil Absolute Prelim 13.13 (H) 1.50 - 7.70 x10 (3) uL    Neutrophil Absolute 13.13 (H) 1.50 - 7.70 x10(3) uL    Lymphocyte Absolute 0.92 (L) 1.00 - 4.00 x10(3) uL    Monocyte Absolute 1.40 (H) 0.10 - 1.00 x10(3) uL    Eosinophil Absolute 0.06 0.00 - 0.70 x10(3) uL    Basophil Absolute 0.03 0.00 - 0.20 x10(3) uL    Immature Granulocyte Absolute 0.12 0.00 - 1.00 x10(3) uL    Neutrophil % 83.8 %    Lymphocyte % 5.9 %    Monocyte % 8.9 %    Eosinophil % 0.4 %    Basophil % 0.2 %    Immature Granulocyte % 0.8 %         Test results/Imaging:   No results found.   Performed an independent visualization of:   mr l spine and mri ls plexus  Imaging revealed: Agree with radiology read.    Education/Instructions given to: patient   Barriers to Learning:None  Content: Refer to note above. Evaluation/Outcome: Verbalized understanding    Disclaimer:   This record was dictated using Dragon software. There may be errors due to voice recognition problems that were not realized and corrected during the completion of the note.      This document is not intended to support charting by exception.  Sections left blank in a completed note should be presumed not to have been done.     Total face to face time was 35 minutes, more than 50% of the time was spent in counseling and/or coordination of care  related to  right leg weakness .    Thank you.  Sheldon Somers D.O.   Vascular & General Neurology    4/11/2025  11:52 AM         [1]    sennosides  17.2 mg Oral Nightly    docusate sodium  100 mg Oral BID    aspirin  325 mg Oral BID    acetaminophen  650 mg Oral Q4H While awake    traMADol  50 mg Oral 4 times per day    pantoprazole  40 mg Oral QAM AC    lithium ER  300 mg Oral QAM    rosuvastatin  10 mg Oral QAM   [2]   polyethylene glycol (PEG 3350)    magnesium hydroxide    bisacodyl    fleet enema    ondansetron    metoclopramide    diphenhydrAMINE **OR** diphenhydrAMINE    oxyCODONE **OR** oxyCODONE    HYDROmorphone **OR** HYDROmorphone    ALPRAZolam  [3]

## 2025-04-11 NOTE — PROGRESS NOTES
[facility]  part of [facility]    Progress Note    Kerline Vizcaino Patient Status:  Outpatient in a Bed    1955 MRN [id number]   Location [facility] Attending Messi Duggan MD   Hosp Day # 0 PCP GABBY GARCIA       Subjective:     No change in weakness of right leg.  Pt also still with numbness of medial right leg.    Objective:   Blood pressure 123/77, pulse 76, temperature 97 °F (36.1 °C), temperature source Temporal, resp. rate 18, height 62\", weight 148 lb (67.1 kg), SpO2 96%.    Gen:   NAD.  A and O x 3  CV:   RRR, no m/g/r  Pulm:   CTA bilat  Abd:   +bs, soft, NT, ND  LE:   No c/c/e  Right hip wound c/d/I.   Right leg knee immobilizer in place.  Neuro:   weakness of right leg, otherwise nonfocal    Results:     Lab Results   Component Value Date    WBC 15.7 (H) 04/10/2025    HGB 10.8 (L) 04/10/2025    HCT 32.2 (L) 04/10/2025    .0 04/10/2025    CREATSERUM 1.38 (H) 04/10/2025    BUN 23 04/10/2025     04/10/2025    K 4.5 04/10/2025     04/10/2025    CO2 25.0 04/10/2025    GLU 73 04/10/2025    CA 8.7 04/10/2025    ALB 4.4 2025    ALKPHO 50 (L) 2025    BILT 0.5 2025    TP 6.9 2025    AST 29 2025    ALT 16 2025       MRI LUMBAR PLEXUS (W+WO)(JCD=76937)  Result Date: 2025  CONCLUSION:   No perineural edema or enhancement.  Soft tissue and intramuscular edema within the right hip consistent with recent hip arthroplasty.  No mass or loculated collection.      Dictated by (CST): Santo Nuñez MD on 2025 at 8:42 AM     Finalized by (CST): Santo Nuñez MD on 2025 at 9:12 AM          MRI SPINE LUMBAR (CPT=72148)  Result Date: 2025  CONCLUSION:   Mild degenerative disc and facet disease throughout the lumbar spine without high-grade canal or foraminal narrowing.  Complex right upper pole renal lesion partially seen and incompletely characterized. If available, comparison with prior imaging is recommended to  demonstrate stability. If none available, followup Nonemergent renal MRI (or triphasic CT if patient cannot  tolerate MRI) is recommended to further evaluate.       Dictated by (CST): Santo Nuñez MD on 4/11/2025 at 7:52 AM     Finalized by (CST): Santo Nuñez MD on 4/11/2025 at 8:02 AM                Assessment and Plan:     1.       Right hip primary osteoarthritis  POD #3 s/p right HUANG  Continued new right leg weakness and numbness of medial right leg.  - neurology on consult  - pt has knee immobilizer  - MRIs of L spine and lumbar plexus are noted and unrevealing  - pain control  - PT//OT  - aspirin for dvt proph  DC planning for home vs rehab.     2.       Bipolar affective disorder.  Continue home medications.   3.       Hyperlipidemia.  Continue home medications.   4.       Chronic kidney disease stage 3.  Continue to monitor postoperatively.    MDM:    High Messi Tejada MD  4/11/2025

## 2025-04-11 NOTE — PLAN OF CARE
Recreation assessment completed. Up with right knee immobilizer for RLE weakness/buckling/ decreased sensation medial right thigh and right calf with strong right dorsi and plantar flexion; . Right hip dressing dry and clean. Abductor pillow in bed. Up with walker and right knee immobilizer to chair/RC/short distance. Pain controlled with oral pain medications scheduled. DC plan is home with [facility]. Voiding bathroom.   Problem: Patient Centered Care  Goal: Patient preferences are identified and integrated in the patient's plan of care  Description: Interventions:- What would you like us to know as we care for you? From home with spouse  - Provide timely, complete, and accurate information to patient/family- Incorporate patient and family knowledge, values, beliefs, and cultural backgrounds into the planning and delivery of care- Encourage patient/family to participate in care and decision-making at the level they choose- Honor patient and family perspectives and choices  Outcome: Progressing     Problem: Patient/Family Goals  Goal: Patient/Family Long Term Goal  Description: Patient's Long Term Goal: Recover at home.   Interventions:- SW and therapy with MDs for DC planning- See additional Care Plan goals for specific interventions  Outcome: Progressing  Goal: Patient/Family Short Term Goal  Description: Patient's Short Term Goal: pain <5/10 Interventions: - medicated with oral pain medication scheduled per her preference- See additional Care Plan goals for specific interventions  Outcome: Progressing

## 2025-04-11 NOTE — OCCUPATIONAL THERAPY NOTE
OCCUPATIONAL THERAPY TREATMENT NOTE - INPATIENT    Room Number: 409/409-A     Presenting Problem: R posterior HUANG- complicated by RLE weakness/numbness.     Problem List  Principal Problem:    Primary osteoarthritis of right hip  Active Problems:    Bipolar affective disorder (HCC)    Hyperlipidemia    CKD (chronic kidney disease) stage 3, GFR 30-59 ml/min ([facility])    Presence of artificial hip, right    Right leg weakness      OCCUPATIONAL THERAPY ASSESSMENT   Patient demonstrates steady progress this session, goals remain in progress.    Patient is requiring up to mod A LB dressing, min A toileting, CGA functional TFRS/mobility w knee immobilizer as a result of the following impairments: decreased functional strength, decreased functional reach, decreased endurance, pain, impaired   balance, decreased muscular endurance, medical status, and limited   ROM.    Patient continues to function below baseline with ADLs and functional mobility/TFRs.  Next session anticipate patient to progress toileting, lower body dressing, transfers, functional standing tolerance, and energy conservation strategies.  Occupational Therapy will continue to follow patient for duration of hospitalization.    Patient continues to benefit from continued skilled OT services: at discharge to promote prior level of function.  Anticipate patient will return home with home health OT.     PLAN DURING HOSPITALIZATION  OT Device Recommendations: Other (Comment), None (owns hip kit tools, shower chair)  OT Treatment Plan: Balance activities, Energy conservation/work simplification techniques, ADL training, Functional transfer training, UE strengthening/ROM, Endurance training, Patient/Family education     SUBJECTIVE  \"I prefer to go home. I think I am doing better\"    OBJECTIVE  Precautions: Knee immobilizer, HUANG - posterior, Hip abduction pillow    WEIGHT BEARING RESTRICTION  R Lower Extremity: Weight Bearing as Tolerated    PAIN ASSESSMENT  Rating:  5  Location: R Hip  Management Techniques: Body mechanics; Activity promotion; Breathing techniques; Relaxation; Repositioning; Other (Comment) (declines ice)        ACTIVITIES OF DAILY LIVING ASSESSMENT  AM-PAC ‘6-Clicks’ Inpatient Daily Activity Short Form  How much help from another person does the patient currently need…  -   Putting on and taking off regular lower body clothing?: A Lot  -   Bathing (including washing, rinsing, drying)?: A Lot  -   Toileting, which includes using toilet, bedpan or urinal? : A Little  -   Putting on and taking off regular upper body clothing?: A Little  -   Taking care of personal grooming such as brushing teeth?: A Little  -   Eating meals?: None    AM-PAC Score:  Score: 17  Approx Degree of Impairment: 50.11%  Standardized Score (AM-PAC Scale): 37.26  CMS Modifier (G-Code): CK      FUNCTIONAL TRANSFER ASSESSMENT  Sit to Stand from Chair: contact guard assist  Toilet Transfer: contact guard assist-raised seat  Chair Transfer: contact guard assist  Comments:    Pt presents seated on toilet. EDU on positioning and mechanics for functional TFRS.   Knee immobilizer in place for functional TFRS and mobility. Cues for posture and positioning in RW    FUNCTIONAL MOBILITY  contact guard assist for in-room fx mobility using RW    ACTIVITIES OF DAILY LIVING  Eating: independent  Grooming: contact guard assist-standing at sink for oral care. Weight offloaded RLE and stabilities w unilateral support on counter and LLE.  Toileting: min assist  Comments:     Discussed task modification, home environment safety and energy conservation with ADLS, TFRs, and functional mobility given RLE weakness and posterior hip precautions. Provided verbal and visual demonstration of hip kit tools. Pt declines trial as she is not to DC today but is aware of how to utilize sock aid, reacher, sponge, shoe horn. Pt reports understanding and insight to safety within the home. Pt reports will have A PRN       Skilled Therapy Provided: Educated pt about role of OT and POC. Reviewed posterior hip precautions with pt. Pt recalls 2/3. Educated pt about adherence to precautions during ADLs and functional transfers along with appropriate AE/DME, task modification, WB status, pain and edema management.  Pt verbalized/demonstrated understanding. At the end, Pt positioned upright in chair with call light and personal items in reach, BLE elevated, declines ice, RN aware.       EDUCATION PROVIDED  Patient Education : Role of Occupational Therapy; Plan of Care; Discharge Recommendations; DME Recommendations; Functional Transfer Techniques; Fall Prevention; Weight Bear Status; Surgical Precautions; Edema Reduction; Posture/Positioning; Energy Conservation; Proper Body Mechanics  Patient's Response to Education: Verbalized Understanding; Returned Demonstration; Requires Further Education    The patient's Approx Degree of Impairment: 50.11% has been calculated based on documentation in the [facility] '6 clicks' Inpatient Daily Activity Short Form.  Research supports that patients with this level of impairment may benefit from HHC.  Final disposition will be made by interdisciplinary medical team.    Patient End of Session: Up in chair, Call light within reach, Needs met, RN aware of session/findings, All patient questions and concerns addressed, Hospital anti-slip socks    OT Goals:    Patients self stated goal is: to go home      Patient will complete functional transfer with SBA  Comment: progressing    Patient will complete toileting with SBA  Comment: progressing    Patient will tolerate standing for 3 minutes in prep for adls with SBA   Comment:progressing    Patient will complete item retrieval with SBA  Comment:            Goals  on:   Frequency: 3-5x/wk    OT Session Time: 28minutes  Self-Care Home Management: 23 minutes  Therapeutic Activity: 5 minutes

## 2025-04-11 NOTE — PLAN OF CARE
1130- Arrived into 409. Up with assist with walker into bed, abductor pillow placed.  Dr Somers present on admission.     1220- Requests to speak with Dr Somers again; message sent. Patient stated she will speak with neurologist tomorrow.

## 2025-04-11 NOTE — PHYSICAL THERAPY NOTE
PHYSICAL THERAPY HIP TREATMENT NOTE - INPATIENT    Room Number: 409/409-A       Presenting Problem: s/p R HUANG  Co-Morbidities : Adrenal cyst (HCC)    Bipolar affective (HCC)   GERD (gastroesophageal reflux disease)   Hiatal hernia    7cm   HLD (hyperlipidemia)   Osteoarthritis   Osteopenia of lumbar spine   Overactive bladder   PONV (postoperative nausea and vomiting)   Stage 3 chronic kidney disease (HCC)   Visual impairment    reading and driving wears glasses   Zenker diverticulum    Problem List  Principal Problem:    Primary osteoarthritis of right hip  Active Problems:    Bipolar affective disorder (HCC)    Hyperlipidemia    CKD (chronic kidney disease) stage 3, GFR 30-59 ml/min (HCC)    Presence of artificial hip, right    Right leg weakness      PHYSICAL THERAPY ASSESSMENT   Patient demonstrates good  progress this session, goals  remain in progress.      Patient is requiring contact guard assist as a result of the following impairments: decreased functional strength, decreased endurance/aerobic capacity, pain, impaired standing balance, decreased muscular endurance, and medical status.     Patient continues to function below baseline with bed mobility, transfers, gait, stair negotiation, maintaining seated position, standing prolonged periods, and performing household tasks.  Next session anticipate patient to progress bed mobility, transfers, gait, stair negotiation, maintaining seated position, standing prolonged periods, and performing household tasks.  Physical Therapy will continue to follow patient for duration of hospitalization.    Patient continues to benefit from continued skilled PT services: at discharge to promote prior level of function and safety with additional support and return home with home health PT (pending progress/improvement of RLE quad strength, patient may need [employer] assistance to enter/exit home for stairs)    PLAN DURING HOSPITALIZATION  Nursing Mobility Recommendation : 1  Assist     PT Treatment Plan: Bed mobility, Body mechanics, Coordination, Endurance, Energy conservation, Gait training, Strengthening, Transfer training, Stair training, Balance training  Frequency (Obs): Daily     SUBJECTIVE  \"I'm just a little discouraged\"    OBJECTIVE  Precautions: HUANG - posterior, Hip abduction pillow    WEIGHT BEARING RESTRICTION  R Lower Extremity: Weight Bearing as Tolerated    PAIN ASSESSMENT   Rating: Unable to rate  Location: RLE  Management Techniques: Activity promotion, Body mechanics, Breathing techniques, Relaxation, Repositioning    BALANCE  Static Sitting: Good  Dynamic Sitting: Fair  Static Standing: Fair  Dynamic Standing: Poor +  ACTIVITY TOLERANCE  BP: 133/89  BP Location: Right arm  BP Method: Automatic  Patient Position: Sitting    AM-PAC '6-Clicks' INPATIENT SHORT FORM - BASIC MOBILITY  How much difficulty does the patient currently have...  Patient Difficulty: Turning over in bed (including adjusting bedclothes, sheets and blankets)?: A Little   Patient Difficulty: Sitting down on and standing up from a chair with arms (e.g., wheelchair, bedside commode, etc.): A Little   Patient Difficulty: Moving from lying on back to sitting on the side of the bed?: A Little   How much help from another person does the patient currently need...   Help from Another: Moving to and from a bed to a chair (including a wheelchair)?: A Little   Help from Another: Need to walk in hospital room?: A Little   Help from Another: Climbing 3-5 steps with a railing?: A Lot     AM-PAC Score:  Raw Score: 17   Approx Degree of Impairment: 50.57%   Standardized Score (AM-PAC Scale): 42.13   CMS Modifier (G-Code): CK    FUNCTIONAL ABILITY STATUS  Functional Mobility/Gait Assessment  Gait Assistance: Contact guard assist  Distance (ft): ~65 feet  Assistive Device: Rolling walker  Pattern:  (decreased nathaniel, decreased step length, forward flexed posture, narrow base of support, decreased weight bearing  through RLE. Patient with incrased trunk extension to compensate and advance RLE, cues to self correct. Patient requires increased verbal cues for sequencing to advance RW forward and stay within RW base of support.)  Rolling:  not tested  Supine to Sit:  not tested (patient up in chair at start/end of session)  Sit to Supine:  not tested  Sit to Stand: contact guard assist    Skilled Therapy Provided: Patient received seated in recliner chair at initiation of session agreeable to participation in PT. Knee immobilizer on at initiation of session. Patient able to progress activity tolerance this session, however continues to present with RLE impaired sensation and weakness limiting independence with mobility. Patient able to perform sit to stand with contact guard assist and BUE support on RW. Patient ambulates ~65 feet with CGA, increased verbal cues for sequencing and rolling walker management. Performed multiple lower extremity exercises during session (noted below) including standing exercises with and emphasis on quad activation. Patient left in bedside chair, lines intact, needs in reach and handoff to RN.     The patient's Approx Degree of Impairment: 50.57% has been calculated based on documentation in the [facility] '6 clicks' Inpatient Basic Mobility Short Form.  Research supports that patients with this level of impairment may benefit from home (pending progress).  Final disposition will be made by interdisciplinary medical team.    Exercises AM Session   Ankle Pumps 10 reps   Quad Sets 15 reps   Heel slides 5 reps   Saq 5 reps   Sitting Knee Extension Unable to perform at this time due to quad weakness.   Standing Weight Shifts 10 reps   Short Squats 15 reps     Patient End of Session: Up in chair, Needs met, Call light within reach, RN aware of session/findings, Hospital anti-slip socks, Alarm set, All patient questions and concerns addressed    CURRENT GOALS   Goals to be met by: 4/23/25  Patient Goal  Patient's self-stated goal is: to return home   Goal #1 Patient is able to demonstrate supine - sit EOB @ level: modified independent   Goal #1   Current Status  Progressing   Goal #2 Patient is able to demonstrate transfers Sit to/from Stand at assistance level: modified independent      Goal #2  Current Status  Progressing   Goal #3 Patient is able to ambulate 300 feet with assistive device at assistance level: modified independent    Goal #3   Current Status  Progressing   Goal #4 Patient will negotiate 8 stairs/one curb w/ assistive device and supervision   Goal #4   Current Status  Progressing   Goal #5 Patient verbalizes and/or demonstrates all precautions and safety concerns independently   Goal #5   Current Status  Progressing   Goal #6 Patient independently performs home exercise program for ROM/strengthening per the instructions provided in preparation for discharge.   Goal #6  Current Status  Progressing     Gait Training: 15 minutes  Therapeutic Activity: 5 minutes  Therapeutic Exercise: 8 minutes    Katia Cowan PT, DPT

## 2025-04-11 NOTE — PROGRESS NOTES
4/10/2025  Admission date 4/8/2025      S: Resting comfortably. Continues to have numbness in medial thigh/leg and weakness flexing hip/extending knee but normal sensation and motor in foot. Otherwise patient doing well. Denies any numbness/tingling, F/C/S, N/V/D, SOB, Chest Pain, Abdominal Pain. Pain well-controlled.     O:    Vitals:    04/11/25 1025   BP: 133/89   Pulse:    Resp:    Temp:        Data Review: {  No results found for this or any previous visit (from the past 24 hours).    Gen:  A&O x 3, VSS, Afebrile    Lower Extremity:   -  Dressing clean, dry, intact  -  2+ Pedal pulses Bilaterally  -  Tibialis Anterior/Gastroc and Soleus/ Extensor Halluses Longus motor in tact  -  Sensory in tact in sural/Saphenous/tibial/superficial peroneal/deep peroneal nerve distributions but with full 5/5 PF/DF. Unable to extend knee or flex hip. Continued decreased sensation medial thigh and medial lower leg.  -  Calf soft/non-tender with no palpable cords        A/P: S/P HUANG 3 Days Post-Op [facility]    1. Weight Bearing: WBAT w/posterior hip precautions  2. Deep Venous thrombosis prophylaxis - Compression stocking/ASA  3. Continue Physical Therapy, Mobilize out of bed. Patient continues with RLE weakness secondary to spinal vs femoral nerve irritation. Continues to resolve. Caution with ambulation if knee continues to buckle, continue use of knee immobilizer today to assist with ambulation  4. Pain control- modified as needed  5. Discharge Planning- pending clearance from Physical Therapy/Social work services. Plan for discharge to home with home health, likely today vs tomorrow due to RLE weakness.     Juan Pablo Layton MD  Adult Reconstruction Fellow  [facility]

## 2025-04-11 NOTE — PLAN OF CARE
Pt worked with PT/OT today. Doing better. Still has numbness to her mid upper thigh down to her calf on the RLE. Can't lift her RLE off the bed. Pt transferring to . Report given to Nicolasa.

## 2025-04-12 PROCEDURE — 99233 SBSQ HOSP IP/OBS HIGH 50: CPT | Performed by: HOSPITALIST

## 2025-04-12 NOTE — PHYSICAL THERAPY NOTE
PHYSICAL THERAPY HIP TREATMENT NOTE - INPATIENT    Room Number: 409/409-A            Presenting Problem: s/p R HUANG  Co-Morbidities : Adrenal cyst (HCC)    Bipolar affective (HCC)   GERD (gastroesophageal reflux disease)   Hiatal hernia    7cm   HLD (hyperlipidemia)   Osteoarthritis   Osteopenia of lumbar spine   Overactive bladder   PONV (postoperative nausea and vomiting)   Stage 3 chronic kidney disease (HCC)   Visual impairment    reading and driving wears glasses   Zenker diverticulum    Problem List  Principal Problem:    Primary osteoarthritis of right hip  Active Problems:    Bipolar affective disorder (HCC)    Hyperlipidemia    CKD (chronic kidney disease) stage 3, GFR 30-59 ml/min (HCC)    Presence of artificial hip, right    Right leg weakness      PHYSICAL THERAPY ASSESSMENT   Patient demonstrates good  progress this session, goals  remain in progress.      Patient is requiring stand-by assist and contact guard assist as a result of the following impairments: decreased functional strength, pain, impaired motor planning, decreased muscular endurance, and medical status.     Patient continues to function near baseline with bed mobility, transfers, and gait.  Next session anticipate patient to progress bed mobility, transfers, gait, and stair negotiation.  Physical Therapy will continue to follow patient for duration of hospitalization.    Patient continues to benefit from continued skilled PT services: at discharge to promote prior level of function and safety with additional support and return home with home health PT.    PLAN DURING HOSPITALIZATION  Nursing Mobility Recommendation : 1 Assist     PT Treatment Plan: Bed mobility, Body mechanics, Patient education, Gait training, Strengthening, Transfer training, Balance training  Frequency (Obs): Daily     SUBJECTIVE  My R leg is numb    OBJECTIVE  Precautions: Knee immobilizer, HUANG - posterior, Hip abduction pillow    WEIGHT BEARING RESTRICTION  R Lower  Extremity: Weight Bearing as Tolerated      PAIN ASSESSMENT   Ratin  Location: RLE  Management Techniques: Activity promotion, Body mechanics, Relaxation, Repositioning    BALANCE  Static Sitting: Good  Dynamic Sitting: Fair +  Static Standing: Fair  Dynamic Standing: Fair -  ACTIVITY TOLERANCE                         O2 WALK       AM-PAC '6-Clicks' INPATIENT SHORT FORM - BASIC MOBILITY  How much difficulty does the patient currently have...  Patient Difficulty: Turning over in bed (including adjusting bedclothes, sheets and blankets)?: A Little   Patient Difficulty: Sitting down on and standing up from a chair with arms (e.g., wheelchair, bedside commode, etc.): A Little   Patient Difficulty: Moving from lying on back to sitting on the side of the bed?: A Little   How much help from another person does the patient currently need...   Help from Another: Moving to and from a bed to a chair (including a wheelchair)?: None   Help from Another: Need to walk in hospital room?: A Little   Help from Another: Climbing 3-5 steps with a railing?: A Lot     AM-PAC Score:  Raw Score: 18   Approx Degree of Impairment: 46.58%   Standardized Score (AM-PAC Scale): 43.63   CMS Modifier (G-Code): CK    FUNCTIONAL ABILITY STATUS  Functional Mobility/Gait Assessment  Gait Assistance: Contact guard assist  Distance (ft): 75 ft  Assistive Device: Rolling walker  Pattern:  (cues for RW navigation)  Stairs: Other (comment)  Rolling: contact guard assist  Supine to Sit: contact guard assist  Sit to Supine: contact guard assist  Sit to Stand: minimal assist    Skilled Therapy Provided: RN approved PT session. Pt in supine and was just talking to MD regarding her numbness in LLE. Instructed on supine thera exs with ble's to promote functional ability. Educated on hip posterior precautions, pt needs further education regarding to observed them with mobility. Donned immobilizer on LLE when out of bed. Amb to bathroom and pt was using  wheeling chair to elevated toilet seat. Washing hands by sink in standing and pt was leaning posterior and needs assist to regain balance with CGA. Amb outside of room. Pt needs cues for correct RW navigation due pt was stepping to close to from of RW. Pt position in chair with all needs in reach.     The patient's Approx Degree of Impairment: 46.58% has been calculated based on documentation in the [facility] '6 clicks' Inpatient Basic Mobility Short Form.  Research supports that patients with this level of impairment may benefit from home with HH PT..  Final disposition will be made by interdisciplinary medical team.    Exercises AM Session    Ankle Pumps     10 reps    Quad Sets 10 reps    Glut Sets 10 reps    Hip Abd/Add 10 reps    Heel slides 10 reps    Saq 10 reps    Sitting Knee Extension 0 reps    Standing heel/toe raises 0 reps    Hamstring Curls 0 reps    Forward, back steps 0 reps    Short Squats 0 reps      Patient End of Session: Up in chair, RN aware of session/findings, All patient questions and concerns addressed, Call light within reach    CURRENT GOALS   Goals to be met by: 4/23/25  Patient Goal Patient's self-stated goal is: to return home   Goal #1 Patient is able to demonstrate supine - sit EOB @ level: modified independent   Goal #1   Current Status  Progressing   Goal #2 Patient is able to demonstrate transfers Sit to/from Stand at assistance level: modified independent      Goal #2  Current Status  Progressing   Goal #3 Patient is able to ambulate 300 feet with assistive device at assistance level: modified independent    Goal #3   Current Status  Progressing   Goal #4 Patient will negotiate 8 stairs/one curb w/ assistive device and supervision   Goal #4   Current Status  Progressing   Goal #5 Patient verbalizes and/or demonstrates all precautions and safety concerns independently   Goal #5   Current Status  Progressing   Goal #6 Patient independently performs home exercise program for  ROM/strengthening per the instructions provided in preparation for discharge.   Goal #6  Current Status  Progressing         Gait Trainin minutes  Therapeutic Activity: 12 minutes

## 2025-04-12 NOTE — PLAN OF CARE
Patient alert and oriented x4. Vss. On room air. Tolerating diet. No nausea. Scheduled tylenol and tramadol for pain control. Dressing c/d/I, continued numbness to inner thigh and calf- ortho aware. Immobilizer with ambulation. Up with 1 assist and RW. Fall precautions in place.   Problem: Patient Centered Care  Goal: Patient preferences are identified and integrated in the patient's plan of care  Description: Interventions:- What would you like us to know as we care for you? - Provide timely, complete, and accurate information to patient/family- Incorporate patient and family knowledge, values, beliefs, and cultural backgrounds into the planning and delivery of care- Encourage patient/family to participate in care and decision-making at the level they choose- Honor patient and family perspectives and choices  Outcome: Progressing     Problem: Patient/Family Goals  Goal: Patient/Family Long Term Goal  Description: Patient's Long Term Goal: Interventions:- - See additional Care Plan goals for specific interventions  Outcome: Progressing  Goal: Patient/Family Short Term Goal  Description: Patient's Short Term Goal: Interventions: -- See additional Care Plan goals for specific interventions  Outcome: Progressing     Problem: PAIN - ADULT  Goal: Verbalizes/displays adequate comfort level or patient's stated pain goal  Description: INTERVENTIONS:- Encourage pt to monitor pain and request assistance- Assess pain using appropriate pain scale- Administer analgesics based on type and severity of pain and evaluate response- Implement non-pharmacological measures as appropriate and evaluate response- Consider cultural and social influences on pain and pain management- Manage/alleviate anxiety- Utilize distraction and/or relaxation techniques- Monitor for opioid side effects- Notify MD/LIP if interventions unsuccessful or patient reports new pain- Anticipate increased pain with activity and pre-medicate as appropriate  Outcome:  Progressing     Problem: SAFETY ADULT - FALL  Goal: Free from fall injury  Description: INTERVENTIONS:- Assess pt frequently for physical needs- Identify cognitive and physical deficits and behaviors that affect risk of falls.- [place] fall precautions as indicated by assessment.- Educate pt/family on patient safety including physical limitations- Instruct pt to call for assistance with activity based on assessment- Modify environment to reduce risk of injury- Provide assistive devices as appropriate- Consider OT/PT consult to assist with strengthening/mobility- Encourage toileting schedule  Outcome: Progressing     Problem: DISCHARGE PLANNING  Goal: Discharge to home or other facility with appropriate resources  Description: INTERVENTIONS:- Identify barriers to discharge w/pt and caregiver- Include patient/family/discharge partner in discharge planning- Arrange for needed discharge resources and transportation as appropriate- Identify discharge learning needs (meds, wound care, etc)- Arrange for interpreters to assist at discharge as needed- Consider post-discharge preferences of patient/family/discharge partner- Complete POLST form as appropriate- Assess patient's ability to be responsible for managing their own health- Refer to Case Management Department for coordinating discharge planning if the patient needs post-hospital services based on physician/LIP order or complex needs related to functional status, cognitive ability or social support system  Outcome: Progressing     Problem: SKIN/TISSUE INTEGRITY - ADULT  Goal: Incision(s), wounds(s) or drain site(s) healing without S/S of infection  Description: INTERVENTIONS:- Assess and document risk factors for pressure ulcer development- Assess and document skin integrity- Assess and document dressing/incision, wound bed, drain sites and surrounding tissue- Implement wound care per orders- Initiate isolation precautions as appropriate- Initiate Pressure Ulcer  prevention bundle as indicated  Outcome: Progressing     Problem: MUSCULOSKELETAL - ADULT  Goal: Return mobility to safest level of function  Description: INTERVENTIONS:- Assess patient stability and activity tolerance for standing, transferring and ambulating w/ or w/o assistive devices- Assist with transfers and ambulation using safe patient handling equipment as needed- Ensure adequate protection for wounds/incisions during mobilization- Obtain PT/OT consults as needed- Advance activity as appropriate- Communicate ordered activity level and limitations with patient/family  Outcome: Progressing  Goal: Maintain proper alignment of affected body part  Description: INTERVENTIONS:- Support and protect limb and body alignment per provider's orders- Instruct and reinforce with patient and family use of appropriate assistive device and precautions (e.g. spinal or hip dislocation precautions)  Outcome: Progressing

## 2025-04-12 NOTE — PLAN OF CARE
Patient AO x4. POD 4. Dressing intact to (R) hip. Immobilizer on when ambulating. Up with 1 and RW. Call light within reach. Fall precautions. Plan for HH today.    Problem: Patient Centered Care  Goal: Patient preferences are identified and integrated in the patient's plan of care  Description: Interventions:- What would you like us to know as we care for you? Home with - Provide timely, complete, and accurate information to patient/family- Incorporate patient and family knowledge, values, beliefs, and cultural backgrounds into the planning and delivery of care- Encourage patient/family to participate in care and decision-making at the level they choose- Honor patient and family perspectives and choices  Outcome: Progressing     Problem: Patient/Family Goals  Goal: Patient/Family Long Term Goal  Description: Patient's Long Term Goal: \Interventions:- - See additional Care Plan goals for specific interventions  Outcome: Progressing  Goal: Patient/Family Short Term Goal  Description: Patient's Short Term Goal: Interventions: - - See additional Care Plan goals for specific interventions  Outcome: Progressing     Problem: PAIN - ADULT  Goal: Verbalizes/displays adequate comfort level or patient's stated pain goal  Description: INTERVENTIONS:- Encourage pt to monitor pain and request assistance- Assess pain using appropriate pain scale- Administer analgesics based on type and severity of pain and evaluate response- Implement non-pharmacological measures as appropriate and evaluate response- Consider cultural and social influences on pain and pain management- Manage/alleviate anxiety- Utilize distraction and/or relaxation techniques- Monitor for opioid side effects- Notify MD/LIP if interventions unsuccessful or patient reports new pain- Anticipate increased pain with activity and pre-medicate as appropriate  Outcome: Progressing     Problem: SAFETY ADULT - FALL  Goal: Free from fall injury  Description:  INTERVENTIONS:- Assess pt frequently for physical needs- Identify cognitive and physical deficits and behaviors that affect risk of falls.- [place] fall precautions as indicated by assessment.- Educate pt/family on patient safety including physical limitations- Instruct pt to call for assistance with activity based on assessment- Modify environment to reduce risk of injury- Provide assistive devices as appropriate- Consider OT/PT consult to assist with strengthening/mobility- Encourage toileting schedule  Outcome: Progressing     Problem: DISCHARGE PLANNING  Goal: Discharge to home or other facility with appropriate resources  Description: INTERVENTIONS:- Identify barriers to discharge w/pt and caregiver- Include patient/family/discharge partner in discharge planning- Arrange for needed discharge resources and transportation as appropriate- Identify discharge learning needs (meds, wound care, etc)- Arrange for interpreters to assist at discharge as needed- Consider post-discharge preferences of patient/family/discharge partner- Complete POLST form as appropriate- Assess patient's ability to be responsible for managing their own health- Refer to Case Management Department for coordinating discharge planning if the patient needs post-hospital services based on physician/LIP order or complex needs related to functional status, cognitive ability or social support system  Outcome: Progressing     Problem: SKIN/TISSUE INTEGRITY - ADULT  Goal: Incision(s), wounds(s) or drain site(s) healing without S/S of infection  Description: INTERVENTIONS:- Assess and document risk factors for pressure ulcer development- Assess and document skin integrity- Assess and document dressing/incision, wound bed, drain sites and surrounding tissue- Implement wound care per orders- Initiate isolation precautions as appropriate- Initiate Pressure Ulcer prevention bundle as indicated  Outcome: Progressing     Problem: MUSCULOSKELETAL -  ADULT  Goal: Return mobility to safest level of function  Description: INTERVENTIONS:- Assess patient stability and activity tolerance for standing, transferring and ambulating w/ or w/o assistive devices- Assist with transfers and ambulation using safe patient handling equipment as needed- Ensure adequate protection for wounds/incisions during mobilization- Obtain PT/OT consults as needed- Advance activity as appropriate- Communicate ordered activity level and limitations with patient/family  Outcome: Progressing  Goal: Maintain proper alignment of affected body part  Description: INTERVENTIONS:- Support and protect limb and body alignment per provider's orders- Instruct and reinforce with patient and family use of appropriate assistive device and precautions (e.g. spinal or hip dislocation precautions)  Outcome: Progressing

## 2025-04-12 NOTE — PROGRESS NOTES
4/12/2025  Admission date 4/8/2025      S: Resting comfortably. Neurology workup found no nerve injury. Continues to have numbness in medial thigh/leg and weakness flexing hip/extending knee but is improving. Still normal sensation and motor in foot. Otherwise patient doing well. Denies any F/C, N/V/D, SOB, Chest Pain, Abdominal Pain. Pain well-controlled.     O:    Vitals:    04/12/25 0750   BP: 124/87   Pulse: 64   Resp: 18   Temp: 98.7 °F (37.1 °C)       Data Review: {  No results found for this or any previous visit (from the past 24 hours).    Gen:  A&O x 3, VSS, Afebrile    Lower Extremity:   -  Dressing clean, dry, intact  -  2+ Pedal pulse  -  Tibialis Anterior/Gastroc and Soleus/ Extensor Halluses Longus motor in tact  -  Sensory in tact in sural/Saphenous/tibial/superficial peroneal/deep peroneal nerve distributions. full 5/5 PF/DF. 3/5 hip flexion. Continued decreased sensation medial thigh and medial lower leg.  -  Calf soft/non-tender with no palpable cords        A/P: S/P HUANG 4 Days Post-Op RTHA    1. Weight Bearing: WBAT w/posterior hip precautions  2. Deep Venous thrombosis prophylaxis - Compression stocking/ASA  3. Continue Physical Therapy, Mobilize out of bed. Patient continues with RLE weakness secondary to spinal vs femoral nerve irritation. Continues to improve. Caution with ambulation if knee continues to buckle, continue use of knee immobilizer today to assist with ambulation  4. Pain control- modified as needed  5. Discharge Planning- pending clearance from Physical Therapy/Social work services. Plan for discharge to home with home health, likely today vs tomorrow     Valerio Rodriges MD  Adult Reconstruction Fellow  [facility]

## 2025-04-12 NOTE — PROGRESS NOTES
[facility]  part of [facility]    Progress Note    Kerline Vizcaino Patient Status:  Inpatient    1955 MRN [id number]   Location [facility] 4W/SW/SE Attending Messi Duggan MD   Hosp Day # 1 PCP GABBY GARCIA       Subjective:     Some improvement in movement of right leg.    Objective:   Blood pressure 124/87, pulse 64, temperature 98.7 °F (37.1 °C), temperature source Oral, resp. rate 18, height 62\", weight 148 lb (67.1 kg), SpO2 97%.    Gen:   NAD.  A and O x 3  CV:   RRR, no m/g/r  Pulm:   CTA bilat  Abd:   +bs, soft, NT, ND  LE:   No c/c/e  Right hip wound c/d/I.   Right leg knee immobilizer in place.  Neuro:   weakness of right leg, otherwise nonfocal    Results:     Lab Results   Component Value Date    WBC 15.7 (H) 04/10/2025    HGB 10.8 (L) 04/10/2025    HCT 32.2 (L) 04/10/2025    .0 04/10/2025    CREATSERUM 1.38 (H) 04/10/2025    BUN 23 04/10/2025     04/10/2025    K 4.5 04/10/2025     04/10/2025    CO2 25.0 04/10/2025    GLU 73 04/10/2025    CA 8.7 04/10/2025    ALB 4.4 2025    ALKPHO 50 (L) 2025    BILT 0.5 2025    TP 6.9 2025    AST 29 2025    ALT 16 2025       MRI LUMBAR PLEXUS (W+WO)(RNA=32302)  Result Date: 2025  CONCLUSION:   No perineural edema or enhancement.  Soft tissue and intramuscular edema within the right hip consistent with recent hip arthroplasty.  No mass or loculated collection.      Dictated by (CST): Santo Nuñez MD on 2025 at 8:42 AM     Finalized by (CST): Santo Nuñez MD on 2025 at 9:12 AM          MRI SPINE LUMBAR (CPT=72148)  Result Date: 2025  CONCLUSION:   Mild degenerative disc and facet disease throughout the lumbar spine without high-grade canal or foraminal narrowing.  Complex right upper pole renal lesion partially seen and incompletely characterized. If available, comparison with prior imaging is recommended to demonstrate stability. If none available, followup  Nonemergent renal MRI (or triphasic CT if patient cannot  tolerate MRI) is recommended to further evaluate.       Dictated by (CST): Santo Nuñez MD on 4/11/2025 at 7:52 AM     Finalized by (CST): Santo Nuñez MD on 4/11/2025 at 8:02 AM                Assessment and Plan:     1.       Right hip primary osteoarthritis  POD #4 s/p right HUANG  Continued new right leg weakness and numbness of medial right leg. - improving  - neurology on consult  - pt has knee immobilizer  - MRIs of L spine and lumbar plexus are noted and unrevealing  - pain control  - PT//OT  - aspirin for dvt proph  DC planning for home with [facility].     2.       Bipolar affective disorder.  Continue home medications.   3.       Hyperlipidemia.  Continue home medications.   4.       Chronic kidney disease stage 3.  Continue to monitor postoperatively.    MDM:    High Messi Tejada MD  4/12/2025

## 2025-04-13 PROCEDURE — 99233 SBSQ HOSP IP/OBS HIGH 50: CPT | Performed by: HOSPITALIST

## 2025-04-13 NOTE — PROGRESS NOTES
4/12/2025  Admission date 4/8/2025      S: Resting comfortably in chair. No events overnight. Continues to improve. Able to extend knee against gravity and weakly flex hip against gravity. No new complaints. Pain remains well controlled.    O:    Vitals:    04/13/25 0852   BP: 145/87   Pulse: 76   Resp: 16   Temp: 98.4 °F (36.9 °C)       Data Review: {  No results found for this or any previous visit (from the past 24 hours).    Gen:  A&O x 3, VSS, Afebrile    Lower Extremity:   -  Dressing clean, dry, intact  -  2+ Pedal pulse  -  Tibialis Anterior/Gastroc and Soleus/ Extensor Halluses Longus motor in tact  -  Sensory in tact in sural/Saphenous/tibial/superficial peroneal/deep peroneal nerve distributions. full 5/5 PF/DF. 3/5 hip flexion and knee extension. Continued decreased sensation medial thigh and medial lower leg.  -  Calf soft/non-tender with no palpable cords        A/P: S/P HUANG 5 Days Post-Op RTHA    1. Weight Bearing: WBAT w/posterior hip precautions  2. Deep Venous thrombosis prophylaxis - Compression stocking/ASA  3. Continue Physical Therapy, Mobilize out of bed. Patient continues with RLE weakness secondary to spinal vs femoral nerve irritation. Continues to improve. Caution with ambulation if knee continues to buckle, continue use of knee immobilizer today to assist with ambulation  4. Pain control- modified as needed  5. Discharge Planning- pending clearance from Physical Therapy/Social work services. Plan for discharge to home with home health when cleared by PT    Valerio Rodriges MD  Adult Reconstruction Fellow  [facility]

## 2025-04-13 NOTE — PROGRESS NOTES
[facility]  part of [facility]    Progress Note    Kerline Vizcaino Patient Status:  Inpatient    1955 MRN [id number]   Location [facility] 4W/SW/SE Attending Messi Duggan MD   Hosp Day # 2 PCP GABBY GARCIA       Subjective:     Right leg strength improving.  Medial right leg is still numb.    Objective:   Blood pressure 145/87, pulse 76, temperature 98.4 °F (36.9 °C), temperature source Oral, resp. rate 16, height 62\", weight 148 lb (67.1 kg), SpO2 96%.    Gen:   NAD.  A and O x 3  CV:   RRR, no m/g/r  Pulm:   CTA bilat  Abd:   +bs, soft, NT, ND  LE:   No c/c/e  Right hip wound c/d/I.   Right leg knee immobilizer in place.  Neuro:   weakness of right leg, otherwise nonfocal    Results:     Lab Results   Component Value Date    WBC 15.7 (H) 04/10/2025    HGB 10.8 (L) 04/10/2025    HCT 32.2 (L) 04/10/2025    .0 04/10/2025    CREATSERUM 1.38 (H) 04/10/2025    BUN 23 04/10/2025     04/10/2025    K 4.5 04/10/2025     04/10/2025    CO2 25.0 04/10/2025    GLU 73 04/10/2025    CA 8.7 04/10/2025    ALB 4.4 2025    ALKPHO 50 (L) 2025    BILT 0.5 2025    TP 6.9 2025    AST 29 2025    ALT 16 2025       No results found.        Assessment and Plan:     1.       Right hip primary osteoarthritis  POD #5 s/p right HUANG  Continued new right leg weakness and numbness of medial right leg. - improving  - neurology on consult  - pt has knee immobilizer  - MRIs of L spine and lumbar plexus are noted and unrevealing  - pain control  - PT//OT  - aspirin for dvt proph  DC planning for home with [facility].  DC home when clears PT.     2.       Bipolar affective disorder.  Continue home medications.   3.       Hyperlipidemia.  Continue home medications.   4.       Chronic kidney disease stage 3.  Continue to monitor postoperatively.    MDM:    High Messi Tejada MD  2025

## 2025-04-13 NOTE — PLAN OF CARE
Problem: Patient Centered Care  Goal: Patient preferences are identified and integrated in the patient's plan of care  Description: Interventions:- What would you like us to know as we care for you? Patient is from home with her spouse and he's her support system.    - Provide timely, complete, and accurate information to patient/family- Incorporate patient and family knowledge, values, beliefs, and cultural backgrounds into the planning and delivery of care- Encourage patient/family to participate in care and decision-making at the level they choose- Honor patient and family perspectives and choices  Outcome: Progressing     Problem: Patient/Family Goals  Goal: Patient/Family Long Term Goal  Description: Patient's Long Term Goal: Patient will be able to walk with walker independently, numbness and weakness on right leg will get resolved and will have other complications from surgery.    Interventions:  - Up as tolerated with walker, assist, WBAT to right leg.  - Monitor for any problems in walking such as unresolved, numbness/tingling, weakness/unsteady gait on right leg.  - May use knee immobilizer to right leg if it is wobbling.  - Pain management with oral medication.  - May ice incision to prevent swelling or to help reduce pain.  - Maintain posterior hip precautions. Use abductor splint when in bed.  - Continue to use incentive spirometry to prevent pneumonia.  - Monitor incision for any signs of infection or bleeding.  - Take oral anticoagulant as ordered to prevent blood clots.  - [facility] PT/RN to follow.  - Follow with surgery as recommended.  - See additional Care Plan goals for specific interventions  Outcome: Progressing  Goal: Patient/Family Short Term Goal  Description: Patient's Short Term Goal: Home when stable and passes therapy doing stairs.    Interventions:   - Up as tolerated with walker, assist, WBAT to right leg.  - Monitor for any problems in walking such as unresolved, numbness/tingling,  weakness/unsteady gait on right leg.  - May use knee immobilizer to right leg if it is wobbling.  - Pain management with oral medication.  - May ice incision to prevent swelling or to help reduce pain.  - Maintain posterior hip precautions. Use abductor splint when in bed.  - Use incentive spirometry 10x/hour to prevent pneumonia.  - Monitor incision for any signs of infection or bleeding.  - Administer oral anticoagulant as ordered to prevent blood clots. SCD's to both legs as well.  - PT/OT as ordered.  - Monitor VS and labs and refer as needed.  - See additional Care Plan goals for specific interventions  Outcome: Progressing     Problem: PAIN - ADULT  Goal: Verbalizes/displays adequate comfort level or patient's stated pain goal  Description: INTERVENTIONS:- Encourage pt to monitor pain and request assistance- Assess pain using appropriate pain scale- Administer analgesics based on type and severity of pain and evaluate response- Implement non-pharmacological measures as appropriate and evaluate response- Consider cultural and social influences on pain and pain management- Manage/alleviate anxiety- Utilize distraction and/or relaxation techniques- Monitor for opioid side effects- Notify MD/LIP if interventions unsuccessful or patient reports new pain- Anticipate increased pain with activity and pre-medicate as appropriate  Outcome: Progressing     Problem: SAFETY ADULT - FALL  Goal: Free from fall injury  Description: INTERVENTIONS:- Assess pt frequently for physical needs- Identify cognitive and physical deficits and behaviors that affect risk of falls.- [place] fall precautions as indicated by assessment.- Educate pt/family on patient safety including physical limitations- Instruct pt to call for assistance with activity based on assessment- Modify environment to reduce risk of injury- Provide assistive devices as appropriate- Consider OT/PT consult to assist with strengthening/mobility- Encourage toileting  schedule  Outcome: Progressing     Problem: DISCHARGE PLANNING  Goal: Discharge to home or other facility with appropriate resources  Description: INTERVENTIONS:- Identify barriers to discharge w/pt and caregiver- Include patient/family/discharge partner in discharge planning- Arrange for needed discharge resources and transportation as appropriate- Identify discharge learning needs (meds, wound care, etc)- Arrange for interpreters to assist at discharge as needed- Consider post-discharge preferences of patient/family/discharge partner- Complete POLST form as appropriate- Assess patient's ability to be responsible for managing their own health- Refer to Case Management Department for coordinating discharge planning if the patient needs post-hospital services based on physician/LIP order or complex needs related to functional status, cognitive ability or social support system  Outcome: Progressing     Problem: SKIN/TISSUE INTEGRITY - ADULT  Goal: Incision(s), wounds(s) or drain site(s) healing without S/S of infection  Description: INTERVENTIONS:- Assess and document risk factors for pressure ulcer development- Assess and document skin integrity- Assess and document dressing/incision, wound bed, drain sites and surrounding tissue- Implement wound care per orders- Initiate isolation precautions as appropriate- Initiate Pressure Ulcer prevention bundle as indicated  Outcome: Progressing     Problem: MUSCULOSKELETAL - ADULT  Goal: Return mobility to safest level of function  Description: INTERVENTIONS:- Assess patient stability and activity tolerance for standing, transferring and ambulating w/ or w/o assistive devices- Assist with transfers and ambulation using safe patient handling equipment as needed- Ensure adequate protection for wounds/incisions during mobilization- Obtain PT/OT consults as needed- Advance activity as appropriate- Communicate ordered activity level and limitations with patient/family  Outcome:  Progressing  Goal: Maintain proper alignment of affected body part  Description: INTERVENTIONS:- Support and protect limb and body alignment per provider's orders- Instruct and reinforce with patient and family use of appropriate assistive device and precautions (e.g. spinal or hip dislocation precautions)  Outcome: Progressing    Patient is alert and oriented, aware to call for help as needed.  Patient is currently in room air, denies shortness of breathing nor chest pain. Patient is up with assist, still weak on her right leg and with numbness as well, but progressing well.  Pain is managed with oral medication.  Patient is voiding well.  Patient plans to go home when she passes therapy doing stairs in the morning.

## 2025-04-13 NOTE — PHYSICAL THERAPY NOTE
PHYSICAL THERAPY HIP TREATMENT NOTE - INPATIENT    Room Number: 409/409-A            Presenting Problem: s/p R HUANG  Co-Morbidities : Adrenal cyst (HCC)    Bipolar affective (HCC)   GERD (gastroesophageal reflux disease)   Hiatal hernia    7cm   HLD (hyperlipidemia)   Osteoarthritis   Osteopenia of lumbar spine   Overactive bladder   PONV (postoperative nausea and vomiting)   Stage 3 chronic kidney disease (HCC)   Visual impairment    reading and driving wears glasses   Zenker diverticulum    Problem List  Principal Problem:    Primary osteoarthritis of right hip  Active Problems:    Bipolar affective disorder (HCC)    Hyperlipidemia    CKD (chronic kidney disease) stage 3, GFR 30-59 ml/min (HCC)    Presence of artificial hip, right    Right leg weakness      PHYSICAL THERAPY ASSESSMENT   Patient demonstrates good  progress this session, goals  remain in progress.      Patient is requiring supervision and contact guard assist as a result of the following impairments: decreased functional strength, decreased endurance/aerobic capacity, pain, decreased muscular endurance, and limited rt hip ROM.     Patient continues to function below baseline with gait, stair negotiation, and standing prolonged periods.  Next session anticipate patient to progress gait, stair negotiation, and standing prolonged periods.  Physical Therapy will continue to follow patient for duration of hospitalization.    Patient continues to benefit from continued skilled PT services: at discharge to promote prior level of function and safety with additional support and return home with home health PT.    PLAN DURING HOSPITALIZATION  Nursing Mobility Recommendation : 1 Assist     PT Treatment Plan: Endurance, Body mechanics, Coordination, Gait training, Balance training, Transfer training, Strengthening  Frequency (Obs): Daily     SUBJECTIVE  I feel better.    OBJECTIVE  Precautions: Limb alert - right, Knee immobilizer    WEIGHT BEARING  RESTRICTION  R Lower Extremity: Weight Bearing as Tolerated      PAIN ASSESSMENT   Rating: 3  Location: r le  Management Techniques: Breathing techniques, Activity promotion, Repositioning    BALANCE  Static Sitting: Good  Dynamic Sitting: Fair +  Static Standing: Fair  Dynamic Standing: Fair  ACTIVITY TOLERANCE  Pulse: 76  Heart Rate Source: Monitor  Resp: 16  BP: 145/87  BP Location: Right arm  BP Method: Automatic  Patient Position: Sitting    O2 WALK  Oxygen Therapy  SPO2% on Room Air at Rest: 99    AM-PAC '6-Clicks' INPATIENT SHORT FORM - BASIC MOBILITY  How much difficulty does the patient currently have...  Patient Difficulty: Turning over in bed (including adjusting bedclothes, sheets and blankets)?: A Little   Patient Difficulty: Sitting down on and standing up from a chair with arms (e.g., wheelchair, bedside commode, etc.): A Little   Patient Difficulty: Moving from lying on back to sitting on the side of the bed?: A Little   How much help from another person does the patient currently need...   Help from Another: Moving to and from a bed to a chair (including a wheelchair)?: None   Help from Another: Need to walk in hospital room?: A Little   Help from Another: Climbing 3-5 steps with a railing?: A Lot     AM-PAC Score:  Raw Score: 18   Approx Degree of Impairment: 46.58%   Standardized Score (AM-PAC Scale): 43.63   CMS Modifier (G-Code): CK    FUNCTIONAL ABILITY STATUS  Functional Mobility/Gait Assessment  Gait Assistance: Contact guard assist  Distance (ft): 100 x 2  Assistive Device: Rolling walker  Pattern: R Decreased stance time, Shuffle, R Foot flat (req const cues for gait pattern)  Stairs: Other (comment)  Rolling:  NT  Supine to Sit: minimal assist  Sit to Supine:  NT  Sit to Stand: supervision    Skilled Therapy Provided: Chart reviewed and RN approved session. Patient in bed agreeable for therapy mobility and  present in room. Min A for rt leg needed during bed mobility d/t inc pain   and weakness of rt quads. Rt Knee immobilizer apply in bed. Patient amb with RW with slow and small nathaniel, steady antalgic gait,  patient req cont cues for proper gait pattern and RW proximity, she is having difficulty to improve her gait. Patient easily tired with amb and took few minutes sitting rest break between. Patient perspired when amb. Offer stairs training by patient doesn't feel ready to try today.     The patient's Approx Degree of Impairment: 46.58% has been calculated based on documentation in the [facility] '6 clicks' Inpatient Basic Mobility Short Form.  Research supports that patients with this level of impairment may benefit from HH PT.  Final disposition will be made by interdisciplinary medical team.    Exercises AM Session    Ankle Pumps     20 reps    Quad Sets 15 reps    Glut Sets  reps    Hip Abd/Add  reps    Heel slides 15 reps    Saq  reps    Sitting Knee Extension 25 reps    Standing heel/toe raises  reps    Hamstring Curls  reps    Forward, back steps  reps    Short Squats  reps      Patient End of Session: Up in chair, Family present, Hospital anti-slip socks, All patient questions and concerns addressed, Bracing education provided per handout, RN aware of session/findings, Call light within reach, Needs met    CURRENT GOALS   Goals to be met by: 4/23/25  Patient Goal Patient's self-stated goal is: to return home   Goal #1 Patient is able to demonstrate supine - sit EOB @ level: modified independent   Goal #1   Current Status  Progressing   Goal #2 Patient is able to demonstrate transfers Sit to/from Stand at assistance level: modified independent      Goal #2  Current Status  Progressing   Goal #3 Patient is able to ambulate 300 feet with assistive device at assistance level: modified independent    Goal #3   Current Status 100 ft x 2 RW CGA   Goal #4 Patient will negotiate 8 stairs/one curb w/ assistive device and supervision   Goal #4   Current Status  Progressing   Goal #5 Patient  verbalizes and/or demonstrates all precautions and safety concerns independently   Goal #5   Current Status  Progressing educated   Goal #6 Patient independently performs home exercise program for ROM/strengthening per the instructions provided in preparation for discharge.   Goal #6  Current Status  Progressing       Gait Trainin minutes  Therapeutic Activity: 15 minutes  Neuromuscular Re-education:  minutes  Therapeutic Exercise: 12 minutes  Canalith Repositioning:  minutes  Manual Therapy:  minutes  Can add/delete any of these

## 2025-04-14 VITALS
HEIGHT: 62 IN | OXYGEN SATURATION: 95 % | DIASTOLIC BLOOD PRESSURE: 83 MMHG | BODY MASS INDEX: 27.23 KG/M2 | SYSTOLIC BLOOD PRESSURE: 128 MMHG | TEMPERATURE: 98 F | HEART RATE: 68 BPM | WEIGHT: 148 LBS | RESPIRATION RATE: 18 BRPM

## 2025-04-14 PROCEDURE — 99239 HOSP IP/OBS DSCHRG MGMT >30: CPT | Performed by: HOSPITALIST

## 2025-04-14 NOTE — DISCHARGE SUMMARY
[facility]  part of [facility]    Discharge Summary    Kerline Vizcaino Patient Status:  Inpatient    1955 MRN [id number]   Location [facility] 4W/SW/SE Attending Messi Duggan MD   Hosp Day # 3 PCP GABBY GARCIA     Date of Admission: 2025 Disposition:   Home with [facility]     Date of Discharge:   2025     Admitting Diagnosis:   Degenerative joint disease, right hip  S/P hip replacement, right    Hospital Discharge Diagnoses:    Right hip primary osteoarthritis  S/p right HUANG 2025 by Dr. Calloway  Right leg weakness and numbness of medial right leg   Hx of bipolar affective disorder  Hx of HL  Hx of CKD3      Problem List:     Problem List[1]    Physical Exam:      /83 (BP Location: Right arm)   Pulse 68   Temp 97.8 °F (36.6 °C) (Temporal)   Resp 18   Ht 62\"   Wt 148 lb (67.1 kg)   SpO2 95%   BMI 27.07 kg/m²     Gen:   NAD.  A and O x 3  CV:   RRR, no m/g/r  Pulm:   CTA bilat  Abd:   +bs, soft, NT, ND  LE:   No c/c/e  Right hip wound c/d/I.   Right leg knee immobilizer in place.  Neuro:   weakness of right leg, otherwise nonfocal      Reason for Admission:   Post right total hip arthroplasty.     HISTORY OF PRESENT ILLNESS:  Patient is a 69-year-old  female with chronic right hip pain and underlying severe primary osteoarthritis, failed outpatient conservative medical management options.  Scheduled today for above-mentioned procedure by her orthopedic surgeon, Dr. Calloway.  Preoperatively, she had spinal block.  Postoperatively, transferred to PACU further monitoring.      Hospital Course:     1.       Right hip primary osteoarthritis  POD #6 s/p right HUANG  Continued new right leg weakness and numbness of medial right leg. - improving  - neurology was on consult  - pt has knee immobilizer  - MRIs of L spine and lumbar plexus are noted and unrevealing  - pain control  - PT//OT  - aspirin for dvt proph  DC planning for home with [facility].   Ortho and  neurology f/u.     2.       Bipolar affective disorder.  Continue home medications.   3.       Hyperlipidemia.  Continue home medications.   4.       Chronic kidney disease stage 3.      Consultations:   Neurology     Discharge Condition:  Good    Discharge Medications:      Discharge Medications        START taking these medications        Instructions Prescription details   acetaminophen 325 MG Tabs  Commonly known as: Tylenol      Take 2 tablets (650 mg total) by mouth every 8 (eight) hours.   Refills: 0     aspirin 325 MG Tbec      Take 1 tablet (325 mg total) by mouth in the morning and 1 tablet (325 mg total) before bedtime.   Refills: 0     Naloxone HCl 4 MG/0.1ML Liqd      4 mg by Nasal route as needed. If patient remains unresponsive, repeat dose in other nostril 2-5 minutes after first dose.   Quantity: 1 kit  Refills: 0     oxyCODONE 5 MG Tabs      Take 1 tablet (5 mg total) by mouth every 4 (four) hours as needed.   Refills: 0     pantoprazole 40 MG Tbec  Commonly known as: Protonix  Replaces: Esomeprazole Magnesium 20 MG Cpdr      Take 1 tablet (40 mg total) by mouth every morning before breakfast.   Refills: 0     Sennosides 17.2 MG Tabs      Take 1 tablet (17.2 mg total) by mouth 2 (two) times daily.   Refills: 0     traMADol 50 MG Tabs  Commonly known as: Ultram      Take 1 tablet (50 mg total) by mouth every 6 (six) hours.   Refills: 0            CONTINUE taking these medications        Instructions Prescription details   ALPRAZolam 0.5 MG Tabs  Commonly known as: Xanax      Take 0.5 tablets (0.25 mg total) by mouth nightly as needed for Sleep.   Refills: 0     celecoxib 200 MG Caps  Commonly known as: CeleBREX      Take 1 capsule (200 mg total) by mouth daily as needed.   Refills: 0     Chelated Magnesium 100 MG Tabs      Take by mouth daily.   Refills: 0     cyanocobalamin 1000 MCG Tabs  Commonly known as: Vitamin B12      Take 1 tablet (1,000 mcg total) by mouth daily.   Refills: 0     Estradiol  10 MCG Tabs      Place 10 mcg vaginally twice a week.   Refills: 0     GLUCOSAMINE COMPLEX OR      Take by mouth daily.   Refills: 0     lithium carbonate 300 MG Caps      Take 1 capsule (300 mg total) by mouth every morning.   Refills: 0     Macrobid 100 MG Caps  Generic drug: nitrofurantoin monohydrate macro      Take 1 capsule (100 mg total) by mouth once as needed (as needed after intercorse).   Refills: 0     psyllium Pack  Commonly known as: Metamucil      Take 1 packet by mouth 2 (two) times daily as needed.   Refills: 0     rosuvastatin 10 MG Tabs  Commonly known as: Crestor      Take 1 tablet (10 mg total) by mouth every morning.   Refills: 0     TURMERIC-GINGER OR      Take 1 capsule by mouth daily.   Refills: 0     Vitamin D3 25 MCG (1000 UT) Caps      Take 1 tablet by mouth daily.   Refills: 0            STOP taking these medications      Esomeprazole Magnesium 20 MG Cpdr  Commonly known as: NEXIUM  Replaced by: pantoprazole 40 MG Tbec                  Where to Get Your Medications        These medications were sent to [id number] - [place], [place] - [address] AT [facility] [address], [phone], [phone] [address], [place] [address]      Phone: [phone]   Naloxone HCl 4 MG/0.1ML Liqd         Follow up Visits:     Follow-up Information       Rogelio Calloway MD Follow up on 4/21/2025.    Specialty: SURGERY, ORTHOPEDIC  Why: 9:00am  Contact information:  [address] [facility] [address] [phone]               Sheldon Somers DO. Schedule an appointment as soon as possible for a visit in 1 week(s).    Specialty: NEUROLOGY  Contact information:  [address] [facility] [address]  [facility] [address] [phone]                             Hospital Discharge Diagnoses: Post right total hip arthroplasty     Lace+ Score: 26  59-90 High Risk  29-58 Medium Risk  0-28   Low Risk.    TCM Follow-Up Recommendation:  LACE 29-58: Moderate Risk of readmission after discharge from  the hospital.    >35 minutes spent preparing this discharge.    Messi Tejada MD  4/14/2025  12:05 PM        [1]   Patient Active Problem List  Diagnosis    Primary osteoarthritis of right hip    Bipolar affective disorder (HCC)    Hyperlipidemia    CKD (chronic kidney disease) stage 3, GFR 30-59 ml/min (HCC)    Presence of artificial hip, right    Right leg weakness

## 2025-04-14 NOTE — PLAN OF CARE
Patient A&Ox4. RA. SCD's and ASA for DVT prophylaxis. Nixon diet. Denies nausea. Pain managed with scheduled medications. Voiding to the bathroom. X1 walker. Does not wear immobilizer- pt states they ambulate well without it. Improved numbness to R medial thigh per pt. Call light within reach. Plan for [facility] pending med clearance.     No acute events overnight. Needs to pass stairs with PT today. Frequent rounding. Bed locked and lowered. Bed alarm on.    Problem: Patient Centered Care  Goal: Patient preferences are identified and integrated in the patient's plan of care  Description: Interventions:- What would you like us to know as we care for you? Patient is from home with her spouse and he's her support system.    - Provide timely, complete, and accurate information to patient/family- Incorporate patient and family knowledge, values, beliefs, and cultural backgrounds into the planning and delivery of care- Encourage patient/family to participate in care and decision-making at the level they choose- Honor patient and family perspectives and choices  Outcome: Progressing     Problem: Patient/Family Goals  Goal: Patient/Family Long Term Goal  Description: Patient's Long Term Goal: Patient will be able to walk with walker independently, numbness and weakness on right leg will get resolved and will have other complications from surgery.    Interventions:  - Up as tolerated with walker, assist, WBAT to right leg.  - Monitor for any problems in walking such as unresolved, numbness/tingling, weakness/unsteady gait on right leg.  - May use knee immobilizer to right leg if it is wobbling.  - Pain management with oral medication.  - May ice incision to prevent swelling or to help reduce pain.  - Maintain posterior hip precautions. Use abductor splint when in bed.  - Continue to use incentive spirometry to prevent pneumonia.  - Monitor incision for any signs of infection or bleeding.  - Take oral anticoagulant as ordered to  prevent blood clots.  - [facility] PT/RN to follow.  - Follow with surgery as recommended.  - See additional Care Plan goals for specific interventions  Outcome: Progressing  Goal: Patient/Family Short Term Goal  Description: Patient's Short Term Goal: Home when stable and passes therapy doing stairs.    Interventions:   - Up as tolerated with walker, assist, WBAT to right leg.  - Monitor for any problems in walking such as unresolved, numbness/tingling, weakness/unsteady gait on right leg.  - May use knee immobilizer to right leg if it is wobbling.  - Pain management with oral medication.  - May ice incision to prevent swelling or to help reduce pain.  - Maintain posterior hip precautions. Use abductor splint when in bed.  - Use incentive spirometry 10x/hour to prevent pneumonia.  - Monitor incision for any signs of infection or bleeding.  - Administer oral anticoagulant as ordered to prevent blood clots. SCD's to both legs as well.  - PT/OT as ordered.  - Monitor VS and labs and refer as needed.  - See additional Care Plan goals for specific interventions  Outcome: Progressing     Problem: PAIN - ADULT  Goal: Verbalizes/displays adequate comfort level or patient's stated pain goal  Description: INTERVENTIONS:- Encourage pt to monitor pain and request assistance- Assess pain using appropriate pain scale- Administer analgesics based on type and severity of pain and evaluate response- Implement non-pharmacological measures as appropriate and evaluate response- Consider cultural and social influences on pain and pain management- Manage/alleviate anxiety- Utilize distraction and/or relaxation techniques- Monitor for opioid side effects- Notify MD/LIP if interventions unsuccessful or patient reports new pain- Anticipate increased pain with activity and pre-medicate as appropriate  Outcome: Progressing     Problem: SAFETY ADULT - FALL  Goal: Free from fall injury  Description: INTERVENTIONS:- Assess pt frequently for physical  needs- Identify cognitive and physical deficits and behaviors that affect risk of falls.- [place] fall precautions as indicated by assessment.- Educate pt/family on patient safety including physical limitations- Instruct pt to call for assistance with activity based on assessment- Modify environment to reduce risk of injury- Provide assistive devices as appropriate- Consider OT/PT consult to assist with strengthening/mobility- Encourage toileting schedule  Outcome: Progressing     Problem: DISCHARGE PLANNING  Goal: Discharge to home or other facility with appropriate resources  Description: INTERVENTIONS:- Identify barriers to discharge w/pt and caregiver- Include patient/family/discharge partner in discharge planning- Arrange for needed discharge resources and transportation as appropriate- Identify discharge learning needs (meds, wound care, etc)- Arrange for interpreters to assist at discharge as needed- Consider post-discharge preferences of patient/family/discharge partner- Complete POLST form as appropriate- Assess patient's ability to be responsible for managing their own health- Refer to Case Management Department for coordinating discharge planning if the patient needs post-hospital services based on physician/LIP order or complex needs related to functional status, cognitive ability or social support system  Outcome: Progressing     Problem: SKIN/TISSUE INTEGRITY - ADULT  Goal: Incision(s), wounds(s) or drain site(s) healing without S/S of infection  Description: INTERVENTIONS:- Assess and document risk factors for pressure ulcer development- Assess and document skin integrity- Assess and document dressing/incision, wound bed, drain sites and surrounding tissue- Implement wound care per orders- Initiate isolation precautions as appropriate- Initiate Pressure Ulcer prevention bundle as indicated  Outcome: Progressing     Problem: MUSCULOSKELETAL - ADULT  Goal: Return mobility to safest level of  function  Description: INTERVENTIONS:- Assess patient stability and activity tolerance for standing, transferring and ambulating w/ or w/o assistive devices- Assist with transfers and ambulation using safe patient handling equipment as needed- Ensure adequate protection for wounds/incisions during mobilization- Obtain PT/OT consults as needed- Advance activity as appropriate- Communicate ordered activity level and limitations with patient/family  Outcome: Progressing  Goal: Maintain proper alignment of affected body part  Description: INTERVENTIONS:- Support and protect limb and body alignment per provider's orders- Instruct and reinforce with patient and family use of appropriate assistive device and precautions (e.g. spinal or hip dislocation precautions)  Outcome: Progressing

## 2025-04-14 NOTE — PHYSICAL THERAPY NOTE
PHYSICAL THERAPY TREATMENT NOTE - INPATIENT     Room Number: 409/409-A       Presenting Problem: s/p R HUANG  Co-Morbidities : Adrenal cyst (HCC)    Bipolar affective (HCC)   GERD (gastroesophageal reflux disease)   Hiatal hernia    7cm   HLD (hyperlipidemia)   Osteoarthritis   Osteopenia of lumbar spine   Overactive bladder   PONV (postoperative nausea and vomiting)   Stage 3 chronic kidney disease (HCC)   Visual impairment    reading and driving wears glasses   Zenker diverticulum    Problem List  Principal Problem:    Primary osteoarthritis of right hip  Active Problems:    Bipolar affective disorder (HCC)    Hyperlipidemia    CKD (chronic kidney disease) stage 3, GFR 30-59 ml/min (HCC)    Presence of artificial hip, right    Right leg weakness      PHYSICAL THERAPY ASSESSMENT   Patient demonstrates good  progress this session, goals  remain in progress.      Patient is requiring up to contact guard assist as a result of the following impairments: decreased functional strength, pain, impaired dynamic standing balance, decreased muscular endurance, medical status, and limited R knee/hip ROM.     Patient continues to function below baseline with bed mobility, transfers, gait, and stair negotiation.  Next session anticipate patient to progress bed mobility, transfers, gait, and stair negotiation.  Physical Therapy will continue to follow patient for duration of hospitalization.    Patient continues to benefit from continued skilled PT services: at discharge to promote prior level of function and safety with additional support and return home with home health PT.    PLAN DURING HOSPITALIZATION  Nursing Mobility Recommendation : 1 Assist  PT Device Recommendation: Rolling walker  PT Treatment Plan: Endurance, Body mechanics, Coordination, Gait training, Balance training, Transfer training, Strengthening  Frequency (Obs): Daily     SUBJECTIVE  Agreeable to activity.     OBJECTIVE  Precautions: HUANG - posterior, Hip  abduction pillow, Knee immobilizer    WEIGHT BEARING RESTRICTION  R Lower Extremity: Weight Bearing as Tolerated    PAIN ASSESSMENT   Rating: Unable to rate  Location: R hip  Management Techniques: Activity promotion, Body mechanics, Repositioning    BALANCE  Static Sitting: Good  Dynamic Sitting: Fair +  Static Standing: Fair  Dynamic Standing: Fair -    AM-PAC '6-Clicks' INPATIENT SHORT FORM - BASIC MOBILITY  How much difficulty does the patient currently have...  Patient Difficulty: Turning over in bed (including adjusting bedclothes, sheets and blankets)?: A Little   Patient Difficulty: Sitting down on and standing up from a chair with arms (e.g., wheelchair, bedside commode, etc.): A Little   Patient Difficulty: Moving from lying on back to sitting on the side of the bed?: A Little   How much help from another person does the patient currently need...   Help from Another: Moving to and from a bed to a chair (including a wheelchair)?: A Little   Help from Another: Need to walk in hospital room?: A Little   Help from Another: Climbing 3-5 steps with a railing?: A Little     AM-PAC Score:  Raw Score: 18   Approx Degree of Impairment: 46.58%   Standardized Score (AM-PAC Scale): 43.63   CMS Modifier (G-Code): CK    FUNCTIONAL ABILITY STATUS  Functional Mobility/Gait Assessment  Gait Assistance: Other (Comment) (SBA)  Distance (ft): 75  Assistive Device: Rolling walker  Pattern: Shuffle, R Decreased stance time, R Foot flat (slow pace, step-to pattern, flexed and guarded posture)  Stairs: Stairs  How Many Stairs: 8  Device: 1 Rail  Assist: Contact guard assist  Pattern: Ascend and Descend  Ascend and Descend : Step to  Supine to Sit: supervision  Sit to Supine: supervision  Sit to Stand: stand-by assist; supervision to/from elevated toilet     Skilled Therapy Provided: Pt received resting in bed and agreeable to activity, requested to use bathroom. Assisted with ambulating to bathroom with RW and she completed toilet  transfer with supervision. Ambulated in hallway with RW. Assist provided to adjust KI for appropriate fit. One seated rest break in hallway. Navigated  8 stairs with BUE on 1 HR and cues given for sequencing of step-to pattern, slow and guarded with increased difficulty when descending. Taken back to room in WC due to fatigue. Pt demos use of leg  to return to bed with supervision. Pt was left resting in bed with needs within reach, handoff to RN complete.     The patient's Approx Degree of Impairment: 46.58% has been calculated based on documentation in the [facility] '6 clicks' Inpatient Daily Activity Short Form.  Research supports that patients with this level of impairment may benefit from home with   PT.  Final disposition will be made by interdisciplinary medical team.    Patient End of Session: Needs met, Call light within reach, In bed, RN aware of session/findings, All patient questions and concerns addressed, Hospital anti-slip socks    CURRENT GOALS   Goals to be met by: 4/23/25  Patient Goal Patient's self-stated goal is: to return home   Goal #1 Patient is able to demonstrate supine - sit EOB @ level: modified independent   Goal #1   Current Status  Progressing   Goal #2 Patient is able to demonstrate transfers Sit to/from Stand at assistance level: modified independent      Goal #2  Current Status  Progressing   Goal #3 Patient is able to ambulate 300 feet with assistive device at assistance level: modified independent    Goal #3   Current Status Progressing    Goal #4 Patient will negotiate 8 stairs/one curb w/ assistive device and supervision   Goal #4   Current Status  Progressing   Goal #5 Patient verbalizes and/or demonstrates all precautions and safety concerns independently   Goal #5   Current Status  Progressing   Goal #6 Patient independently performs home exercise program for ROM/strengthening per the instructions provided in preparation for discharge.   Goal #6  Current Status   Progressing      Therapeutic Activity: 24 minutes

## 2025-04-14 NOTE — PLAN OF CARE
Patient A&Ox4. RA. SCD's and ASA for DVT prophylaxis. Nixon diet. Denies nausea. Pain managed with scheduled medications. Voiding to the bathroom. X1 walker. Does not wear immobilizer- pt states they ambulate well without it. Improved numbness to R medial thigh per pt. Call light within reach. Plan for [facility] pending med clearance.     Problem: Patient Centered Care  Goal: Patient preferences are identified and integrated in the patient's plan of care  Description: Interventions:- What would you like us to know as we care for you? Patient is from home with her spouse and he's her support system.    - Provide timely, complete, and accurate information to patient/family- Incorporate patient and family knowledge, values, beliefs, and cultural backgrounds into the planning and delivery of care- Encourage patient/family to participate in care and decision-making at the level they choose- Honor patient and family perspectives and choices  Outcome: Progressing     Problem: Patient/Family Goals  Goal: Patient/Family Long Term Goal  Description: Patient's Long Term Goal: Patient will be able to walk with walker independently, numbness and weakness on right leg will get resolved and will have other complications from surgery.    Interventions:  - Up as tolerated with walker, assist, WBAT to right leg.  - Monitor for any problems in walking such as unresolved, numbness/tingling, weakness/unsteady gait on right leg.  - May use knee immobilizer to right leg if it is wobbling.  - Pain management with oral medication.  - May ice incision to prevent swelling or to help reduce pain.  - Maintain posterior hip precautions. Use abductor splint when in bed.  - Continue to use incentive spirometry to prevent pneumonia.  - Monitor incision for any signs of infection or bleeding.  - Take oral anticoagulant as ordered to prevent blood clots.  - [facility] PT/RN to follow.  - Follow with surgery as recommended.  - See additional Care Plan goals for  specific interventions  Outcome: Progressing  Goal: Patient/Family Short Term Goal  Description: Patient's Short Term Goal: Home when stable and passes therapy doing stairs.    Interventions:   - Up as tolerated with walker, assist, WBAT to right leg.  - Monitor for any problems in walking such as unresolved, numbness/tingling, weakness/unsteady gait on right leg.  - May use knee immobilizer to right leg if it is wobbling.  - Pain management with oral medication.  - May ice incision to prevent swelling or to help reduce pain.  - Maintain posterior hip precautions. Use abductor splint when in bed.  - Use incentive spirometry 10x/hour to prevent pneumonia.  - Monitor incision for any signs of infection or bleeding.  - Administer oral anticoagulant as ordered to prevent blood clots. SCD's to both legs as well.  - PT/OT as ordered.  - Monitor VS and labs and refer as needed.  - See additional Care Plan goals for specific interventions  Outcome: Progressing     Problem: PAIN - ADULT  Goal: Verbalizes/displays adequate comfort level or patient's stated pain goal  Description: INTERVENTIONS:- Encourage pt to monitor pain and request assistance- Assess pain using appropriate pain scale- Administer analgesics based on type and severity of pain and evaluate response- Implement non-pharmacological measures as appropriate and evaluate response- Consider cultural and social influences on pain and pain management- Manage/alleviate anxiety- Utilize distraction and/or relaxation techniques- Monitor for opioid side effects- Notify MD/LIP if interventions unsuccessful or patient reports new pain- Anticipate increased pain with activity and pre-medicate as appropriate  Outcome: Progressing     Problem: SAFETY ADULT - FALL  Goal: Free from fall injury  Description: INTERVENTIONS:- Assess pt frequently for physical needs- Identify cognitive and physical deficits and behaviors that affect risk of falls.- [place] fall precautions as  indicated by assessment.- Educate pt/family on patient safety including physical limitations- Instruct pt to call for assistance with activity based on assessment- Modify environment to reduce risk of injury- Provide assistive devices as appropriate- Consider OT/PT consult to assist with strengthening/mobility- Encourage toileting schedule  Outcome: Progressing     Problem: DISCHARGE PLANNING  Goal: Discharge to home or other facility with appropriate resources  Description: INTERVENTIONS:- Identify barriers to discharge w/pt and caregiver- Include patient/family/discharge partner in discharge planning- Arrange for needed discharge resources and transportation as appropriate- Identify discharge learning needs (meds, wound care, etc)- Arrange for interpreters to assist at discharge as needed- Consider post-discharge preferences of patient/family/discharge partner- Complete POLST form as appropriate- Assess patient's ability to be responsible for managing their own health- Refer to Case Management Department for coordinating discharge planning if the patient needs post-hospital services based on physician/LIP order or complex needs related to functional status, cognitive ability or social support system  Outcome: Progressing     Problem: SKIN/TISSUE INTEGRITY - ADULT  Goal: Incision(s), wounds(s) or drain site(s) healing without S/S of infection  Description: INTERVENTIONS:- Assess and document risk factors for pressure ulcer development- Assess and document skin integrity- Assess and document dressing/incision, wound bed, drain sites and surrounding tissue- Implement wound care per orders- Initiate isolation precautions as appropriate- Initiate Pressure Ulcer prevention bundle as indicated  Outcome: Progressing     Problem: MUSCULOSKELETAL - ADULT  Goal: Return mobility to safest level of function  Description: INTERVENTIONS:- Assess patient stability and activity tolerance for standing, transferring and ambulating  w/ or w/o assistive devices- Assist with transfers and ambulation using safe patient handling equipment as needed- Ensure adequate protection for wounds/incisions during mobilization- Obtain PT/OT consults as needed- Advance activity as appropriate- Communicate ordered activity level and limitations with patient/family  Outcome: Progressing  Goal: Maintain proper alignment of affected body part  Description: INTERVENTIONS:- Support and protect limb and body alignment per provider's orders- Instruct and reinforce with patient and family use of appropriate assistive device and precautions (e.g. spinal or hip dislocation precautions)  Outcome: Progressing

## 2025-04-14 NOTE — PROGRESS NOTES
Patient cleared for discharge today. All belongings packed and taken with patient and spouse. Discharge education provided to patient and  at bedside. All questions and concerns addressed. Patient being discharged home with home health care.

## 2025-04-14 NOTE — PROGRESS NOTES
4/12/2025  Admission date 4/8/2025      S: Resting comfortably. No events overnight. No new complaints. Pain remains well controlled.    O:    Vitals:    04/14/25 0500   BP: 144/86   Pulse: 81   Resp: 18   Temp: 97.7 °F (36.5 °C)       Data Review: {  No results found for this or any previous visit (from the past 24 hours).    Gen:  A&O x 3, VSS, Afebrile    Lower Extremity:   -  Dressing clean, dry, intact  -  2+ Pedal pulse  -  Tibialis Anterior/Gastroc and Soleus/ Extensor Halluses Longus motor in tact  -  Sensory in tact in sural/Saphenous/tibial/superficial peroneal/deep peroneal nerve distributions. full 5/5 PF/DF. 3/5 hip flexion and knee extension. Continued decreased sensation medial thigh and medial lower leg.  -  Calf soft/non-tender with no palpable cords        A/P: S/P HUANG 6 Days Post-Op [facility]    1. Weight Bearing: WBAT w/posterior hip precautions  2. Deep Venous thrombosis prophylaxis - Compression stocking/ASA  3. Continue Physical Therapy, Mobilize out of bed. Patient continues with RLE weakness secondary to spinal vs femoral nerve irritation. Continues to improve. Caution with ambulation if knee continues to buckle, continue use of knee immobilizer today to assist with ambulation  4. Pain control- modified as needed  5. Discharge Planning- pending clearance from Physical Therapy/Social work services. Plan for discharge to home with home health when cleared by PT, otherwise ready for DC from ortho perspective    Valerio Rodriges MD  Adult Reconstruction Fellow  [facility]

## 2025-04-14 NOTE — PAYOR COMM NOTE
--------------  ADMISSION REVIEW     Payor: [facility]  Subscriber #:  [id number]  Authorization Number: [id number]    Admit date: 4/11/25  Admit time:  3:10 PM       REVIEW DOCUMENTATION: 4/8-4/13    PROCEDURE DATE: 4/8/2025     PREOPERATIVE DIAGNOSIS: Degenerative joint disease, right hip      POSTOPERATIVE DIAGNOSIS: Same     PROCEDURE: right total hip arthoplasty     ANESTHESIA: Spinal     PREOPERATIVE ANTIBIOTICS: 2Ancef 2 g IV within 60 minutes of procedure start.      ESTIMATED BLOOD LOSS: 200 mL.     ESTIMATED IV FLUIDS: see anesthesia record     ESTIMATED URINE OUTPUT: no john     IMPLANTS   1. Alisa G7 acetabular shell, multihole, 50 mm outer diameter, size D.   2. 6.5 mm bone screws x 2  3. Alisa G7 highly cross-linked polyethylene liner; neutral, 36 mm inner diameter, size D.   4. Alisa Avenir complete collarless stem, Coxa vara,  size 2.   5. Alisa Biolox Delta ceramic femoral head, 36 mm, +3.5, 12-14 taper.      SPECIMENS  Femoral head to pathology     COMPLICATIONS   None apparent.      FINDINGS   Consistent with end-stage hip degenerative joint disease.           4/9    Pt has numb area on medial right leg.   Pt with weakness of right leg and cannot stand on the leg.  Pt had numbness and weakness of right foot yesterday however this is resolved today.     Objective:   Blood pressure 134/76, pulse 60, temperature 98.2 °F (36.8 °C), resp. rate 16, height 62\", weight 148 lb (67.1 kg), SpO2 93%.     Gen:   NAD.  A and O x 3  CV:   RRR, no m/g/r  Pulm:   CTA bilat  Abd:   +bs, soft, NT, ND  LE:   No c/c/e  Right hip wound c/d/i  Neuro:   weakness of right leg, otherwise nonfocal    1.       Right hip primary osteoarthritis  POD #1 s/p right HUANG  Right leg weakness, likely nerve block wearing off  - pain control  - PT//OT  - aspirin for dvt proph     2.       Bipolar affective disorder.  Continue home medications.   3.       Hyperlipidemia.  Continue home medications.   4.       Chronic kidney disease  stage 3.  Continue to monitor postoperatively.     MDM:    High         4/10    Pt still with right leg weakness.  Pt cannot put weight on the right leg.     Objective:   Blood pressure 123/73, pulse 80, temperature 98.4 °F (36.9 °C), temperature source Temporal, resp. rate 18, height 62\", weight 148 lb (67.1 kg), SpO2 94%.     Gen:   NAD.  A and O x 3  CV:   RRR, no m/g/r  Pulm:   CTA bilat  Abd:   +bs, soft, NT, ND  LE:   No c/c/e  Right hip wound c/d/i  Neuro:   weakness of right leg, otherwise nonfocal     Results:            Lab Results   Component Value Date     WBC 15.7 (H) 04/10/2025     HGB 10.8 (L) 04/10/2025     HCT 32.2 (L) 04/10/2025     .0 04/10/2025     CREATSERUM 1.38 (H) 04/10/2025     BUN 23 04/10/2025      04/10/2025     K 4.5 04/10/2025      04/10/2025     CO2 25.0 04/10/2025     GLU 73 04/10/2025     CA 8.7 04/10/2025     1.       Right hip primary osteoarthritis  POD #2 s/p right HUANG  Continued new right leg weakness   - pain control  - PT//OT  - aspirin for dvt proph  Pt may need rehab.     2.       Bipolar affective disorder.  Continue home medications.   3.       Hyperlipidemia.  Continue home medications.   4.       Chronic kidney disease stage 3.  Continue to monitor postoperatively.     MDM:    High      4/10 Neurology  Assessment  Sensory changes (allodynia/hypoesthesia) involving the medial aspect of her leg would fit with the femoral nerve and its sensory branch the saphenous nerve.  Decreased patellar reflex can also be seen in femoral neuropathy.  However, she had weakness in her right thigh abductors, which would not fit with a femoral neuropathy.        Allodynia/hypoesthesia in the medial right leg, right hip flexor and knee extension weakness, right abduction weakness, decreased right patellar reflex  Differential Diagnosis:  Femoral neuropathy,  Less likely lumbar radiculopathy but will order MRI to evaluate.  Can also help evaluate for any causes of  compression of the femoral nerve or involvement of the iliopsoas  Lumbosacral plexopathy less likely but could be responsible for her weakness in abduction  Diagnostics:  L-spine  MRI lumbosacral plexus  Therapeutics:  Continue PT OT.            4/11    No change in weakness of right leg.  Pt also still with numbness of medial right leg.     Objective:   Blood pressure 123/77, pulse 76, temperature 97 °F (36.1 °C), temperature source Temporal, resp. rate 18, height 62\", weight 148 lb (67.1 kg), SpO2 96%.     Gen:   NAD.  A and O x 3  CV:   RRR, no m/g/r  Pulm:   CTA bilat  Abd:   +bs, soft, NT, ND  LE:   No c/c/e  Right hip wound c/d/I.   Right leg knee immobilizer in place.  Neuro:   weakness of right leg, otherwise nonfocal       MRI LUMBAR PLEXUS (W+WO)(LDW=37797)  Result Date: 4/11/2025  CONCLUSION:   No perineural edema or enhancement.  Soft tissue and intramuscular edema within the right hip consistent with recent hip arthroplasty.  No mass or loculated collection.      Dictated by (CST): Santo Nuñez MD on 4/11/2025 at 8:42 AM     Finalized by (CST): Santo Nuñez MD on 4/11/2025 at 9:12 AM           MRI SPINE LUMBAR (CPT=72148)  Result Date: 4/11/2025  CONCLUSION:   Mild degenerative disc and facet disease throughout the lumbar spine without high-grade canal or foraminal narrowing.  Complex right upper pole renal lesion partially seen and incompletely characterized. If available, comparison with prior imaging is recommended to demonstrate stability. If none available, followup Nonemergent renal MRI (or triphasic CT if patient cannot  tolerate MRI) is recommended to further evaluate.       Dictated by (CST): Santo Nuñez MD on 4/11/2025 at 7:52 AM     Finalized by (CST): Santo Nuñez MD on 4/11/2025 at 8:02 AM                  Assessment and Plan:      1.       Right hip primary osteoarthritis  POD #3 s/p right HUANG  Continued new right leg weakness and numbness of medial right leg.  - neurology on consult  -  pt has knee immobilizer  - MRIs of L spine and lumbar plexus are noted and unrevealing  - pain control  - PT//OT  - aspirin for dvt proph  DC planning for home vs rehab.     2.       Bipolar affective disorder.  Continue home medications.   3.       Hyperlipidemia.  Continue home medications.   4.       Chronic kidney disease stage 3.  Continue to monitor postoperatively.     MDM:    High        4/11 Neuro  Kerline Vizcaino is a 69 year old  Kerline Vizcaino is a 69 year old  female w/ a pmhx sig. for bipolar, CKD, who is being seen for right leg weakness after a right total hip arthroplasty.  Weakness has been improving.  On repeat exam on 4/11/2022 she still has weakness in her hip flexors and knee extensors.  She also still has weakness in her right  Thigh abductors.     Overall suspect that she may have a right lumbar plexopathy involving both the femoral nerve and the superior gluteal nerve rather than an isolated femoral neuropathy. Her MRI of the lumbosacral plexus does not have signs of injury/trauma.  Stretch, compression ,retraction near the pelvic brim could cause her sx.     Recommend aggressive PT and outpt EMG.      Further management per orthopaedic surgery.            Allodynia/hypoesthesia in the medial right leg, right hip flexor and knee extension weakness, right abduction weakness, decreased right patellar reflex   Differential Diagnosis:   Superior lumbosacral plexopathy        Plan  Diagnostics:  Mris completed  Outpt EMG if sx don't improve   Therapeutics:  PT/OT/  Neurochecks Q4     1. Presence of artificial hip, right  - oxyCODONE 5 MG Oral Tab; Take 1 tablet (5 mg total) by mouth every 4 (four) hours as needed.  - traMADol 50 MG Oral Tab; Take 1 tablet (50 mg total) by mouth every 6 (six) hours.        4/11 Ortho  A/P: S/P HUANG 3 Days Post-Op RTHA     1. Weight Bearing: WBAT w/posterior hip precautions  2. Deep Venous thrombosis prophylaxis - Compression stocking/ASA  3. Continue Physical Therapy,  Mobilize out of bed. Patient continues with RLE weakness secondary to spinal vs femoral nerve irritation. Continues to resolve. Caution with ambulation if knee continues to buckle, continue use of knee immobilizer today to assist with ambulation  4. Pain control- modified as needed          4/12    Some improvement in movement of right leg.     Objective:   Blood pressure 124/87, pulse 64, temperature 98.7 °F (37.1 °C), temperature source Oral, resp. rate 18, height 62\", weight 148 lb (67.1 kg), SpO2 97%.     Gen:   NAD.  A and O x 3  CV:   RRR, no m/g/r  Pulm:   CTA bilat  Abd:   +bs, soft, NT, ND  LE:   No c/c/e  Right hip wound c/d/I.   Right leg knee immobilizer in place.  Neuro:   weakness of right leg, otherwise nonfocal     1.       Right hip primary osteoarthritis  POD #4 s/p right HUANG  Continued new right leg weakness and numbness of medial right leg. - improving  - neurology on consult  - pt has knee immobilizer  - MRIs of L spine and lumbar plexus are noted and unrevealing  - pain control  - PT//OT  - aspirin for dvt proph  DC planning for home with [facility].     2.       Bipolar affective disorder.  Continue home medications.   3.       Hyperlipidemia.  Continue home medications.   4.       Chronic kidney disease stage 3.  Continue to monitor postoperatively.     MDM:    High          4/13    Right leg strength improving.  Medial right leg is still numb.     Objective:   Blood pressure 145/87, pulse 76, temperature 98.4 °F (36.9 °C), temperature source Oral, resp. rate 16, height 62\", weight 148 lb (67.1 kg), SpO2 96%.     Gen:   NAD.  A and O x 3  CV:   RRR, no m/g/r  Pulm:   CTA bilat  Abd:   +bs, soft, NT, ND  LE:   No c/c/e  Right hip wound c/d/I.   Right leg knee immobilizer in place.  Neuro:   weakness of right leg, otherwise nonfocal    1.       Right hip primary osteoarthritis  POD #5 s/p right HUANG  Continued new right leg weakness and numbness of medial right leg. - improving  - neurology on  consult  - pt has knee immobilizer  - MRIs of L spine and lumbar plexus are noted and unrevealing  - pain control  - PT//OT  - aspirin for dvt proph  DC planning for home with [facility].  DC home when clears PT.     2.       Bipolar affective disorder.  Continue home medications.   3.       Hyperlipidemia.  Continue home medications.   4.       Chronic kidney disease stage 3.  Continue to monitor postoperatively.     MDM:    High    Medications 04/08/25 04/09/25 04/10/25 04/11/25 04/12/25 04/13/25 04/14/25   acetaminophen (Tylenol Extra Strength) tab 1,000 mg  Dose: 1,000 mg  Freq: Once Route: OR  Start: 04/08/25 0800 End: 04/08/25 0825   Admin Instructions:   Administer to patients 18 years and older in Pre-op ONLY if patient DID NOT TAKE prior to arrival. Hold if patient has a diagnosis of cirrhosis and/or true allergy to acetaminophen.    0825 GR-Given              acetaminophen (Tylenol) tab 650 mg  Dose: 650 mg  Freq: Every 4 hours while awake Route: OR  Start: 04/08/25 1800    1848 CL-Given      0052 DD-Given     0709 DD-Given     (1000 BV)-Not Given [C]     1333 BV-Given     1824 BV-Given     2113 KM-Given      0632 KM-Given     1122 BV-Given     (1400 BV)-Not Given [C]     1752 BV-Given     2146 JAMAL-Given      0552 JAMAL-Given     0921 MO-Given     1310 BN-Given     1841 BN-Given     (2052 AG)-Not Given      0606 AG-Given     1022 DD-Given     1504 DD-Given     1806 DD-Given     2243 RAMON-Given      0550 RAMON-Given     0958 CL-Given     1413 CL-Given     1806 CL-Given     2215 RAMON-Given [C]      0515 RAMON-Given     1108 TI-Given     1400     1800     2200                       ceFAZolin (Ancef) 2g in 10mL IV syringe premix  Dose: 2 g  Freq: Every 8 hours Route: IV  Last Dose: Stopped (04/09/25 0802)  Start: 04/08/25 1830 End: 04/09/25 0802   Admin Instructions:   Pharmacist may adjust for renal function to provide 24 hours of post-procedural coverage.   Order specific questions:       1848 CL-New Bag      0327 DD-New Bag      0802 BV-Stopped             ceFAZolin (Ancef) 2g in 10mL IV syringe premix  Dose: 2 g  Freq: Once Route: IV  Start: 04/08/25 0945 End: 04/08/25 1026   Admin Instructions:   Re-dose if surgery lasts longer than 4 hours   Order specific questions:       1026 JK-Given                  sodium chloride 0.9 % IV bolus 1,000 mL  Dose: 1,000 mL  Freq: Once Route: IV  Last Dose: Stopped (04/09/25 1028)  Start: 04/09/25 0845 End: 04/09/25 1028     0836 BV-New Bag     1028 BV-Stopped                ALPRAZolam (Xanax) tab 0.25 mg  Dose: 0.25 mg  Freq: Nightly PRN Route: OR  PRN Reason: Sleep  Start: 04/08/25 1540    2136 DD-Given      2113 KM-Given       0103 JAMAL-Given     2100 AG-Given              Vitals (last day)       Date/Time Temp Pulse Resp BP SpO2 Weight O2 Device O2 Flow Rate (L/min) Who    04/14/25 0802 97.8 °F (36.6 °C) 68 18 128/83 95 % -- None (Room air) -- SS    04/14/25 0500 97.7 °F (36.5 °C) 81 18 144/86 98 % -- None (Room air) -- DP    04/13/25 2011 98.9 °F (37.2 °C) 73 16 126/74 95 % -- None (Room air) -- RAMON    04/13/25 1456 98.5 °F (36.9 °C) 57 18 124/81 100 % -- None (Room air) -- NH    04/13/25 1414 -- -- -- -- -- 148 lb (67.1 kg) -- -- CL    04/13/25 1412 98.3 °F (36.8 °C) 67 16 145/85 100 % -- None (Room air) -- CL    04/13/25 0900 -- 76 16 145/87 -- -- -- -- DK    04/13/25 0852 98.4 °F (36.9 °C) 76 16 145/87 96 % -- None (Room air) -- CL    04/13/25 0429 98.7 °F (37.1 °C) 73 17 120/76 94 % -- None (Room air) -- KT

## 2025-04-15 NOTE — PAYOR COMM NOTE
--------------  DISCHARGE REVIEW    Payor: [facility]  Subscriber #:  [id number]  Authorization Number: [id number]    Admit date: 25  Admit time:   3:10 PM  Discharge Date: 2025  3:00 PM     Admitting Physician: Rogelio Calloway MD  Attending Physician:  No att. providers found  Primary Care Physician: Anjali Bentley          Discharge Summary Notes        Discharge Summary signed by Messi Duggan MD at 2025 12:09 PM       Author: Messi Duggan MD Specialty: HOSPITALIST, HOSPITALIST Author Type: Physician    Filed: 2025 12:09 PM Date of Service: 2025 12:05 PM Status: Signed    : Messi Duggan MD (Physician)           [facility]  part of [facility]    Discharge Summary    Kerline Vizcaino Patient Status:  Inpatient    1955 MRN [id number]   Location [facility] 4W//SE Attending Messi Duggan MD   Hosp Day # 3 PCP ANJALI BENTLEY     Date of Admission: 2025 Disposition:   Home with [facility]     Date of Discharge:   2025     Admitting Diagnosis:   Degenerative joint disease, right hip  S/P hip replacement, right    Hospital Discharge Diagnoses:    Right hip primary osteoarthritis  S/p right HUANG 2025 by Dr. Calloway  Right leg weakness and numbness of medial right leg   Hx of bipolar affective disorder  Hx of HL  Hx of CKD3      Problem List:     Problem List[1]    Physical Exam:      /83 (BP Location: Right arm)   Pulse 68   Temp 97.8 °F (36.6 °C) (Temporal)   Resp 18   Ht 62\"   Wt 148 lb (67.1 kg)   SpO2 95%   BMI 27.07 kg/m²     Gen:   NAD.  A and O x 3  CV:   RRR, no m/g/r  Pulm:   CTA bilat  Abd:   +bs, soft, NT, ND  LE:   No c/c/e  Right hip wound c/d/I.   Right leg knee immobilizer in place.  Neuro:   weakness of right leg, otherwise nonfocal      Reason for Admission:   Post right total hip arthroplasty.     HISTORY OF PRESENT ILLNESS:  Patient is a 69-year-old  female with chronic right hip pain and  underlying severe primary osteoarthritis, failed outpatient conservative medical management options.  Scheduled today for above-mentioned procedure by her orthopedic surgeon, Dr. Calloway.  Preoperatively, she had spinal block.  Postoperatively, transferred to PACU further monitoring.      Hospital Course:     1.       Right hip primary osteoarthritis  POD #6 s/p right HUANG  Continued new right leg weakness and numbness of medial right leg. - improving  - neurology was on consult  - pt has knee immobilizer  - MRIs of L spine and lumbar plexus are noted and unrevealing  - pain control  - PT//OT  - aspirin for dvt proph  DC planning for home with [facility].   Ortho and neurology f/u.     2.       Bipolar affective disorder.  Continue home medications.   3.       Hyperlipidemia.  Continue home medications.   4.       Chronic kidney disease stage 3.      Consultations:   Neurology     Discharge Condition:  Good    Discharge Medications:      Discharge Medications        START taking these medications        Instructions Prescription details   acetaminophen 325 MG Tabs  Commonly known as: Tylenol      Take 2 tablets (650 mg total) by mouth every 8 (eight) hours.   Refills: 0     aspirin 325 MG Tbec      Take 1 tablet (325 mg total) by mouth in the morning and 1 tablet (325 mg total) before bedtime.   Refills: 0     Naloxone HCl 4 MG/0.1ML Liqd      4 mg by Nasal route as needed. If patient remains unresponsive, repeat dose in other nostril 2-5 minutes after first dose.   Quantity: 1 kit  Refills: 0     oxyCODONE 5 MG Tabs      Take 1 tablet (5 mg total) by mouth every 4 (four) hours as needed.   Refills: 0     pantoprazole 40 MG Tbec  Commonly known as: Protonix  Replaces: Esomeprazole Magnesium 20 MG Cpdr      Take 1 tablet (40 mg total) by mouth every morning before breakfast.   Refills: 0     Sennosides 17.2 MG Tabs      Take 1 tablet (17.2 mg total) by mouth 2 (two) times daily.   Refills: 0     traMADol 50 MG  Tabs  Commonly known as: Ultram      Take 1 tablet (50 mg total) by mouth every 6 (six) hours.   Refills: 0            CONTINUE taking these medications        Instructions Prescription details   ALPRAZolam 0.5 MG Tabs  Commonly known as: Xanax      Take 0.5 tablets (0.25 mg total) by mouth nightly as needed for Sleep.   Refills: 0     celecoxib 200 MG Caps  Commonly known as: CeleBREX      Take 1 capsule (200 mg total) by mouth daily as needed.   Refills: 0     Chelated Magnesium 100 MG Tabs      Take by mouth daily.   Refills: 0     cyanocobalamin 1000 MCG Tabs  Commonly known as: Vitamin B12      Take 1 tablet (1,000 mcg total) by mouth daily.   Refills: 0     Estradiol 10 MCG Tabs      Place 10 mcg vaginally twice a week.   Refills: 0     GLUCOSAMINE COMPLEX OR      Take by mouth daily.   Refills: 0     lithium carbonate 300 MG Caps      Take 1 capsule (300 mg total) by mouth every morning.   Refills: 0     Macrobid 100 MG Caps  Generic drug: nitrofurantoin monohydrate macro      Take 1 capsule (100 mg total) by mouth once as needed (as needed after intercorse).   Refills: 0     psyllium Pack  Commonly known as: Metamucil      Take 1 packet by mouth 2 (two) times daily as needed.   Refills: 0     rosuvastatin 10 MG Tabs  Commonly known as: Crestor      Take 1 tablet (10 mg total) by mouth every morning.   Refills: 0     TURMERIC-GINGER OR      Take 1 capsule by mouth daily.   Refills: 0     Vitamin D3 25 MCG (1000 UT) Caps      Take 1 tablet by mouth daily.   Refills: 0            STOP taking these medications      Esomeprazole Magnesium 20 MG Cpdr  Commonly known as: NEXIUM  Replaced by: pantoprazole 40 MG Tbec                  Where to Get Your Medications        These medications were sent to [id number] - [place], [place] - [address], [phone], [phone] [address], [facility] [address]      Phone: [phone]   Naloxone HCl 4 MG/0.1ML Liqd          Follow up Visits:     Follow-up Information       Rogelio Calloway MD Follow up on 4/21/2025.    Specialty: SURGERY, ORTHOPEDIC  Why: 9:00am  Contact information:  [address] [facility] [address]  [phone]               Sheldon Somers DO. Schedule an appointment as soon as possible for a visit in 1 week(s).    Specialty: NEUROLOGY  Contact information:  [address] [facility] [address]  [facility] [address]  [phone]                             Hospital Discharge Diagnoses: Post right total hip arthroplasty     Lace+ Score: 26  59-90 High Risk  29-58 Medium Risk  0-28   Low Risk.    TCM Follow-Up Recommendation:  LACE 29-58: Moderate Risk of readmission after discharge from the hospital.    >35 minutes spent preparing this discharge.    Messi Tejada MD  4/14/2025  12:05 PM       Electronically signed by Messi Duggan MD on 4/14/2025 12:09 PM         REVIEWER COMMENTS         [1]   Patient Active Problem List  Diagnosis    Primary osteoarthritis of right hip    Bipolar affective disorder (HCC)    Hyperlipidemia    CKD (chronic kidney disease) stage 3, GFR 30-59 ml/min (HCC)    Presence of artificial hip, right    Right leg weakness

## 2025-05-07 ENCOUNTER — OFFICE VISIT (OUTPATIENT)
Dept: NEUROLOGY | Facility: CLINIC | Age: 70
End: 2025-05-07
Payer: COMMERCIAL

## 2025-05-07 VITALS — SYSTOLIC BLOOD PRESSURE: 122 MMHG | HEART RATE: 69 BPM | DIASTOLIC BLOOD PRESSURE: 77 MMHG

## 2025-05-07 DIAGNOSIS — N28.1 RENAL CYST: ICD-10-CM

## 2025-05-07 DIAGNOSIS — R29.898 RIGHT LEG WEAKNESS: Primary | ICD-10-CM

## 2025-05-07 PROCEDURE — 3078F DIAST BP <80 MM HG: CPT | Performed by: OTHER

## 2025-05-07 PROCEDURE — 99214 OFFICE O/P EST MOD 30 MIN: CPT | Performed by: OTHER

## 2025-05-07 PROCEDURE — 3074F SYST BP LT 130 MM HG: CPT | Performed by: OTHER

## 2025-05-07 RX ORDER — PSEUDOEPHED/ACETAMINOPH/DIPHEN 30MG-500MG
TABLET ORAL
COMMUNITY
Start: 2025-04-09

## 2025-05-07 RX ORDER — DOCUSATE SODIUM 50 MG AND SENNOSIDES 8.6 MG 8.6; 5 MG/1; MG/1
2 TABLET, FILM COATED ORAL 2 TIMES DAILY PRN
COMMUNITY
Start: 2025-04-09

## 2025-05-07 NOTE — PATIENT INSTRUCTIONS
Follow up with your primary care doctor about the  cyst in the right upper pole of your kidney.it was 1.5 cm in April 2024 and it is 7.6 cm in April 2025.

## 2025-05-07 NOTE — PROGRESS NOTES
The following individual(s) verbally consented to be recorded using ambient AI listening technology and understand that they can each withdraw their consent to this listening technology at any point by asking the clinician to turn off or pause the recording:    Patient name: Kerline Vizcaino  Additional names:      STEPHAN MORENO

## 2025-05-07 NOTE — PROGRESS NOTES
[facility] [address], [address]  [facility] [address]  [phone]        Neurology Clinic Follow Up Note    Chief Complaint:  Neurologic Problem (Patient presents here today to establish care, patient was referred by ED 4/8/25 to evaluate and treat leg weakness. Patient c/o right leg weakness and numbness. MRI LUMBAR PLEXUS & MRI SPINE LUMBAR completed. /Patient gives verbal consent to use Abridge. /)           The following individual(s) verbally consented to be recorded using ambient AI listening technology and understand that they can each withdraw their consent to this listening technology at any point by asking the clinician to turn off or pause the recording:    Patient name: Kerline Vizcaino  Additional names:   STEPHAN MORENO      HPI:    Kerline Vizcaino is a 69 year old  woman w/ a pmhx sig. for bipolar, CKD, who is being seen  in hospital discharge follow up for right leg weakness after a right total hip arthroplasty.  Weakness was improving when she was hospitalized.   On repeat exam on 4/11/2022 she still has weakness in her hip flexors and knee extensors.  She also still has weakness in her right thigh abductors.    She had a mri l spine and mri lumbosacral plexus which were normal.     Overall suspected that she may have a right lumbar plexopathy involving both the femoral nerve and the superior gluteal nerve rather than an isolated femoral neuropathy. Her MRI of the lumbosacral plexus does not have signs of injury/trauma.  Stretch, compression ,retraction near the pelvic brim could  have caused her sx.      05/07/25 Interval History/Subjective :   Here in follow up for right leg weakness (hip flexors and knee extensors) after she had a right hip total arthroplasty.  Suspected to be a femoral neuropathy per orthopedic surgery.   History of Present Illness  Kerline Vizcaino is a 69 year old female who presents with leg weakness and numbness following recent hospitalization.  She is accompanied by her , who assists her with mobility and transportation.    She experiences persistent leg weakness and numbness following a recent hospitalization. There is some improvement with physical therapy, but she still requires assistance with walking, using a cane and a walker. She has significant difficulty with stairs, requiring her 's assistance for safety. She was hospitalized for a week and has been receiving outpatient physical therapy since discharge.    She describes weakness in her hip flexors and knee extensors, particularly on the right side, with difficulty controlling her knee and a tendency for her foot to 'flop to the center.' Numbness is primarily on the inside of her leg, extending to her knee, which she describes as 'extremely numb.'    She has a history of a large, calcified cyst above her adrenal gland, noted since at least 2022, and recent imaging has identified a complex renal lesion in the right upper pole of her kidney, which has grown from 1.5 cm to 6.7 cm over the past year.    Her current medications include Xanax, which she uses to manage anxiety related to medical procedures.    She has been working from home due to her mobility issues, as she cannot safely navigate stairs independently.    Persistent numbness and weakness in her right leg, particularly in the hip flexors and knee extensors. No buzzing sensation but describes a 'stingy' feeling in the affected areas. Her foot tends to turn inward when walking.          ROS: Pertinent positive and negatives per HPI.  All others were reviewed and negative.     Medications:  Current Outpatient Medications   Medication Instructions    Acetaminophen Extra Strength 500 MG Oral Tab TAKE 2 TABLETS BY MOUTH EVERY 8 HOURS AS DIRECTED    ALPRAZolam (XANAX) 0.25 mg, Nightly PRN    aspirin 325 mg, 2 times daily    celecoxib (CELEBREX) 200 mg, DAILY PRN    Chelated Magnesium 100 MG Oral Tab Daily    Cholecalciferol  (VITAMIN D3) 25 MCG (1000 UT) Oral Cap 1 tablet, Daily    cyanocobalamin (VITAMIN B12) 1,000 mcg, Daily    Estradiol 10 mcg, Twice weekly    Glucosamine HCl-Glucosamin SO4 (GLUCOSAMINE COMPLEX OR) Daily    lithium carbonate 300 mg, Every morning    Naloxone HCl 4 mg, Nasal, As needed, If patient remains unresponsive, repeat dose in other nostril 2-5 minutes after first dose.    nitrofurantoin monohydrate macro (MACROBID) 100 mg, Once as needed    oxyCODONE 5 mg, Every 4 hours PRN    pantoprazole (PROTONIX) 40 mg, Every morning before breakfast    psyllium Oral Powd Pack 1 packet, 2 times daily PRN    rosuvastatin (CRESTOR) 10 mg, Every morning    Sennosides 17.2 mg, 2 times daily    STIMULANT LAXATIVE 8.6-50 MG Oral Tab 2 tablets, 2 times daily PRN    traMADol (ULTRAM) 50 mg, Every 6 hours scheduled    TURMERIC-GINGER OR 1 capsule, Daily        Reviewed and assessed      Objective:  Last vitals and weight :  There is no height or weight on file to calculate BMI.   Vitals:    05/07/25 1322   Patient Position: Sitting   BP Location: Left arm      Blood pressure 122/77, pulse 69.  /77 (BP Location: Left arm, Patient Position: Sitting, Cuff Size: large)   Pulse 69       Exam:  Physical Exam:  General:alert, appears stated age, and cooperative     Pulm: CTAB  Neurologic Exam  - Mental Status: Alert and interactive. Oriented to person, place, time, and purpose of visit. Funds of knowledge are good. Speech is spontaneous, fluent, and prosodic.   - Cranial Nerves: No gaze preference. VF intact B/L. EOMI. No nystagmus. No ptosis. V1-V3 intact B/L to light touch. Hearing grossly intact. Symmetric: brow furrow, cheek puff, and smile.  No flattening of the nasolabial fold. Tongue midline. No atrophy or fasiculations of the tongue noted. Palate and uvula elevate symmetrically.  Shoulder shrug symmetric.  No spastic dysarthric. Voice is not nasal in quality.     - Motor:    normal tone/bulk        Right Left     Motor  Strength   Deltoids 5 5  Triceps 5 5  Biceps 5 5  Wrist extension: 5 5   5 5     Hip Flexors 2 to 3 5  Abductors 5 5 - improved   ADDuctors 5 5 -  Knee Flexors 5 5  Knee Extensor 2 5  Foot Plantar Flexors 4+ 4+  Foot Dorsi Flexors 4+ 5           Reflexes:    C5 C6 C7  L4 S1   R 2+ 2+ 2+ 0 2+   L 2+ 2+ 2+ 3+ 2+          - Sensory:  Light touch:  Vibration:  decreased over right patella. Reports \"a weird feeling over her right tibia. Intact in her left leg.     Temperature:   reports stinging sensation in her medial right leg below the knee.  Pinprick:  numb/stinging sensation in her medial right leg. Allodynia when testing pp over the knee. Gradient loss to pp over the anterior surface of her leg. Intact in the lateral and posterior leg.      - Cerebellum: No truncal ataxia. No titubations. No dysmetria, no dysdiadochokinesis. No overshoot.      Physical Exam  CRANIAL NERVES: Cranial nerves VII and XII intact.  MOTOR: Upper extremity strength normal.  SENSATION: Altered sensation medial aspect of right knee. Normal sensation lateral aspect of right knee.  MUSCULOSKELETAL: Left hip flexion strength normal. Right hip flexion strength weak. Left knee extension and flexion strength normal. Right knee extension strength weak, flexion strength normal. Left foot dorsiflexion strength normal. Right foot dorsiflexion strength slightly weak.           Most recent lab results:     No results found for this or any previous visit (from the past 36 hours).  Reviewed and assessed    Diagnostic studies:  Performed an independent visualization of  mri l spine and mri lumbosacral plexus  Imaging revealed:   agree w/ read     Assessment     Assessment & Plan  Right leg weakness after right total hip arthroplasty  Persistent right leg weakness post-right total hip arthroplasty, with weakness in hip flexors, knee extensors, and right thigh abductors. Numbness on the medial aspect of the leg. Difficulty with stairs and requires  assistance with ambulation. Physical therapy shows some improvement. EMG recommended for detailed nerve assessment.  - Order EMG to assess nerve function.  - Continue physical therapy.    Complex renal cyst, right kidney  Complex renal cyst in the right kidney, increased from 1.5 cm to 6.7 cm. Requires further evaluation for stability and nature. Comparison with prior imaging recommended.  - Order renal MRI for further evaluation of the complex renal cyst.  - Follow up with primary care physician for management.    Adrenal gland cyst  Large, calcified cyst above the adrenal gland, asymptomatic. No immediate intervention required.         Plan   1. Right leg weakness  - EMG (at [facility]); Future    2. Renal cyst  - MRI ABDOMEN (W+WO) (CPT=74183); Future  Follow up w/ PCP                Education Provided  Education/Instructions given to: patient  and spouse  Barriers to Learning: none   Content: Refer to note above   Evaluation/Outcome: verbalized understanding     This document is not intended to support charting by exception.  Sections left blank in a completed note should be presumed not to have been done.      Disclaimer:   This record was dictated using  Dragon software. There may be errors due to voice recognition problems that were not realized and corrected during the completion of the note.         Thank you for allowing me to participate in the care of your patient.    Sheldon Somers DO  5/7/2025

## 2025-05-12 ENCOUNTER — PROCEDURE VISIT (OUTPATIENT)
Dept: PHYSICAL MEDICINE AND REHAB | Facility: CLINIC | Age: 70
End: 2025-05-12
Payer: COMMERCIAL

## 2025-05-12 DIAGNOSIS — G57.21 FEMORAL NEUROPATHY OF RIGHT LOWER EXTREMITY: Primary | ICD-10-CM

## 2025-05-12 PROCEDURE — 95886 MUSC TEST DONE W/N TEST COMP: CPT | Performed by: PHYSICAL MEDICINE & REHABILITATION

## 2025-05-12 PROCEDURE — 95907 NVR CNDJ TST 1-2 STUDIES: CPT | Performed by: PHYSICAL MEDICINE & REHABILITATION

## 2025-05-12 NOTE — PROGRESS NOTES
[facility]  Electromyography Consultation      History of Present Illness:    Dear Dr. Somers,  Thank you for the opportunity to see Kerline TIMI Vizcaino for electrodiagnostic consultation today. As you know the patient is a 69 year old female with a chief complaint of right leg weakness status post right total hip arthroplasty.  There was weakness in the hip flexors and knee extensors.  MRIs of the lumbar spine were normal.    PAST MEDICAL HISTORY:  Past Medical History[1]    SURGICAL HISTORY:  Past Surgical History[2]    SOCIAL HISTORY:   Social History     Occupational History    Not on file   Tobacco Use    Smoking status: Never     Passive exposure: Past    Smokeless tobacco: Never   Vaping Use    Vaping status: Never Used   Substance and Sexual Activity    Alcohol use: Yes     Comment: 3 drinks on weekends only    Drug use: Never    Sexual activity: Not on file       FAMILY HISTORY:   Family History[3]    CURRENT MEDICATIONS:   Current Medications[4]    PHYSICAL EXAM:   There were no vitals taken for this visit.    There is no height or weight on file to calculate BMI.      General: No immediate distress   Extremities: peripheral pulses intact, no lower extremity edema bilaterally   Skin: No lesions noted.   Neuro:   Strength: Weak right quadriceps, intact right iliopsoas, other muscles are normal  Muscle bulk: No atrophy  Sensation: Decreased over the anterior thigh, also over the saphenous nerve distribution  Reflexes: Areflexic lower extremities  SLR: Negative bilaterally      EMG/NCV  Sensory Nerve Conduction Study       Nerve / Sites Peak Lat Amp Segments Distance    ms µV  cm   R Sural - (Antidromic)      Calf 3.7 10.8 Calf - Ankle 14       Motor Nerve Conduction Study       Nerve / Sites Latency Amplitude Rel Amp Dur. Dur. Segments Distance Velocity Area Area    ms mV % ms %  cm m/s mVms %   R Peroneal - EDB      Ankle 4.1 4.7 100 5.5 100 Ankle - EDB 8  15.6 100       B. Fib Head 10.6 4.1 87.8 6.5 117 B. Fib Head - Ankle 32 49 14.8 95.2      A. Fib Head 12.0 4.1 99.9 6.4 117 A. Fib Head - B. Fib Head 8 59 14.8 95       EMG Summary Table     Spontaneous MUAP Recruitment   Muscle IA Fib PSW Fasc Comments Amp Dur. PPP Pattern   R. Vastus lateralis N 4+ 4+ None None N N N Single Unit   R. Peroneus longus N None None None None N N N N   R. Gastrocnemius (Medial head) N None None None None N N N N   R. Adductor longus N None None None None N N N N   R. Vastus medialis N 4+ 4+ None None N N N Reduced   R. Iliopsoas N None None None None N N N N   R. Gluteus medius N None None None None N N N N           Findings: Extremities were warmed with hot packs for 15 minutes prior to testing and skin temperature maintained above 32 C.  Sensory nerve conduction studies revealed a normal right sural response.  Motor nerve conduction studies revealed a normal right, peroneal motor response.  Needle EMG revealed 4+ fibrillations in the vastus lateralis and vastus medialis.  Motor units were recruitable in both of these muscles.  All other muscles were normal including the adductor longus and iliopsoas.  Additionally the gluteus medius was normal.  Please see EMG summary table above for details.  Impression:  1.  Abnormal study.  2.  Electrodiagnostic evidence is consistent with subacute right femoral neuropathy.  There are abundant fibrillations, however motor units are recruitable in the quadriceps.  3.  No electrodiagnostic evidence of right lumbar plexopathy.  4.  No electrodiagnostic evidence of right lumbar radiculopathy.      ASSESSMENT AND PLAN:  1. Femoral neuropathy of right lower extremity  The patient has electrodiagnostic and clinical evidence consistent with right femoral neuropathy.  There is severe denervation so the prognosis is guarded, however the presence of recruitable motor units indicates that further reinnervation and clinical improvement of weakness should continue to  occur.  I recommend a repeat EMG in 6 months if there is poor motor recovery to help determine long-term prognosis.        Thank you for the opportunity to participate in the care of this patient.  Sincerely,    Vernon Simons M.D.  Diplomate American Board of Physical Medicine and Rehabilitation         [1]   Past Medical History:   Adrenal cyst (HCC)    Bipolar affective (HCC)    GERD (gastroesophageal reflux disease)    Hiatal hernia    7cm    HLD (hyperlipidemia)    Osteoarthritis    Osteopenia of lumbar spine    Overactive bladder    PONV (postoperative nausea and vomiting)    Renal disorder    Kidney function acceted by Lithium medication    Stage 3 chronic kidney disease (HCC)    Visual impairment    reading and driving wears glasses    Zenker diverticulum   [2]   Past Surgical History:  Procedure Laterality Date    Colonoscopy      Upper gi endoscopy,exam     [3]   Family History  Problem Relation Age of Onset    Other (aortic valve surgery) Mother     Bipolar Disorder Mother     Other (MI) Father     Other (osteoarthritis) Sister     Bipolar Disorder Maternal Grandmother    [4]   Current Outpatient Medications   Medication Sig Dispense Refill    STIMULANT LAXATIVE 8.6-50 MG Oral Tab Take 2 tablets by mouth 2 (two) times daily as needed.      Acetaminophen Extra Strength 500 MG Oral Tab TAKE 2 TABLETS BY MOUTH EVERY 8 HOURS AS DIRECTED      pantoprazole 40 MG Oral Tab EC Take 1 tablet (40 mg total) by mouth every morning before breakfast.      aspirin 325 MG Oral Tab EC Take 1 tablet (325 mg total) by mouth in the morning and 1 tablet (325 mg total) before bedtime.      sennosides 17.2 MG Oral Tab Take 1 tablet (17.2 mg total) by mouth 2 (two) times daily.      traMADol 50 MG Oral Tab Take 1 tablet (50 mg total) by mouth every 6 (six) hours.      Naloxone HCl 4 MG/0.1ML Nasal Liquid 4 mg by Nasal route as needed. If patient remains unresponsive, repeat dose in other nostril 2-5 minutes after first dose.  1 kit 0    TURMERIC-GINGER OR Take 1 capsule by mouth daily.      psyllium Oral Powd Pack Take 1 packet by mouth 2 (two) times daily as needed.      rosuvastatin 10 MG Oral Tab Take 1 tablet (10 mg total) by mouth every morning.      Estradiol 10 MCG Vaginal Tab Place 10 mcg vaginally twice a week.      nitrofurantoin monohydrate macro (MACROBID) 100 MG Oral Cap Take 1 capsule (100 mg total) by mouth once as needed (as needed after intercorse).      celecoxib 200 MG Oral Cap Take 1 capsule (200 mg total) by mouth daily as needed.      Glucosamine HCl-Glucosamin SO4 (GLUCOSAMINE COMPLEX OR) Take by mouth daily.      ALPRAZolam 0.5 MG Oral Tab Take 0.5 tablets (0.25 mg total) by mouth nightly as needed for Sleep.      cyanocobalamin 1000 MCG Oral Tab Take 1 tablet (1,000 mcg total) by mouth daily.      Cholecalciferol (VITAMIN D3) 25 MCG (1000 UT) Oral Cap Take 1 tablet by mouth daily.      Lithium Carbonate 300 MG Oral Cap Take 1 capsule (300 mg total) by mouth every morning.

## 2025-06-28 ENCOUNTER — HOSPITAL ENCOUNTER (OUTPATIENT)
Dept: MRI IMAGING | Facility: HOSPITAL | Age: 70
Discharge: HOME | End: 2025-06-28
Attending: Other
Payer: COMMERCIAL

## 2025-06-28 DIAGNOSIS — N28.1 RENAL CYST: ICD-10-CM

## 2025-06-28 PROCEDURE — 74183 MRI ABD W/O CNTR FLWD CNTR: CPT | Performed by: OTHER

## 2025-06-28 PROCEDURE — A9575 INJ GADOTERATE MEGLUMI 0.1ML: HCPCS | Performed by: OTHER

## 2025-06-28 RX ORDER — GADOTERATE MEGLUMINE 376.9 MG/ML
15 INJECTION INTRAVENOUS
Status: COMPLETED | OUTPATIENT
Start: 2025-06-28 | End: 2025-06-28

## 2025-06-28 RX ADMIN — GADOTERATE MEGLUMINE 15 ML: 376.9 INJECTION INTRAVENOUS at 08:33:00

## 2025-08-22 ENCOUNTER — OFFICE VISIT (OUTPATIENT)
Dept: OTOLARYNGOLOGY | Facility: CLINIC | Age: 70
End: 2025-08-22

## 2025-08-22 ENCOUNTER — OFFICE VISIT (OUTPATIENT)
Dept: AUDIOLOGY | Facility: CLINIC | Age: 70
End: 2025-08-22

## 2025-08-22 VITALS — HEIGHT: 62 IN | BODY MASS INDEX: 27.23 KG/M2 | WEIGHT: 148 LBS

## 2025-08-22 DIAGNOSIS — H91.90 HEARING LOSS, UNSPECIFIED HEARING LOSS TYPE, UNSPECIFIED LATERALITY: Primary | ICD-10-CM

## 2025-08-22 DIAGNOSIS — H91.90 DECREASED HEARING, UNSPECIFIED LATERALITY: Primary | ICD-10-CM

## 2025-08-22 DIAGNOSIS — H90.3 SENSORINEURAL HEARING LOSS, BILATERAL: ICD-10-CM

## 2025-08-22 PROCEDURE — 92557 COMPREHENSIVE HEARING TEST: CPT | Performed by: AUDIOLOGIST

## 2025-08-22 PROCEDURE — 92567 TYMPANOMETRY: CPT | Performed by: AUDIOLOGIST

## 2025-08-22 PROCEDURE — 3008F BODY MASS INDEX DOCD: CPT | Performed by: OTOLARYNGOLOGY

## 2025-08-22 PROCEDURE — 99203 OFFICE O/P NEW LOW 30 MIN: CPT | Performed by: OTOLARYNGOLOGY

## (undated) DEVICE — SLIM BODY SKIN STAPLER: Brand: APPOSE ULC

## (undated) DEVICE — SOLUTION IRRIG 1000ML 0.9% NACL USP BTL

## (undated) DEVICE — ADHESIVE SKIN CLSR 0.7ML TOP DERMBND ADV

## (undated) DEVICE — 1010 S-DRAPE TOWEL DRAPE 10/BX: Brand: STERI-DRAPE™

## (undated) DEVICE — ZZ-CONVERTED-TO-522442- SPONGE 4X4 10PK

## (undated) DEVICE — INTENDED USE FOR SURGICAL MARKING ON INTACT SKIN, ALSO PROVIDES A PERMANENT METHOD OF IDENTIFYING OBJECTS IN THE OPERATING ROOM: Brand: WRITESITE® PLUS MINI PREP RESISTANT MARKER

## (undated) DEVICE — NEEDLE SPNL 18GA L3.5IN W/ QNCKE SHARPER BVL

## (undated) DEVICE — 3M™ TEGADERM™ TRANSPARENT FILM DRESSING FRAME STYLE, 1626W, 4 IN X 4-3/4 IN (10 CM X 12 CM), 50/CT 4CT/CASE: Brand: 3M™ TEGADERM™

## (undated) DEVICE — SCREW, PELVIC, G2, 136MM: Brand: INTELLIJOINT HIP®

## (undated) DEVICE — 60 ML SYRINGE LUER-LOCK TIP: Brand: MONOJECT

## (undated) DEVICE — SOLUTION IRRIG 3000ML 0.9% NACL FLX CONT

## (undated) DEVICE — GAMMEX® PI HYBRID SIZE 8.5, STERILE POWDER-FREE SURGICAL GLOVE, POLYISOPRENE AND NEOPRENE BLEND: Brand: GAMMEX

## (undated) DEVICE — 35 ML SYRINGE LUER-LOCK TIP: Brand: MONOJECT

## (undated) DEVICE — 3M™ IOBAN™ 2 ANTIMICROBIAL INCISE DRAPE 6650EZ: Brand: IOBAN™ 2

## (undated) DEVICE — 40409 ABDUCTION PILLOW MEDIUM: Brand: 40409 ABDUCTION PILLOW MEDIUM

## (undated) DEVICE — DRAPE CAM FOR THA NAVIGATION SYS

## (undated) DEVICE — PACK CDS TOTAL HIP

## (undated) DEVICE — TOWEL,OR,DSP,ST,BLUE,DLX,2/PK,40PK/CS: Brand: MEDLINE

## (undated) DEVICE — HOOD: Brand: FLYTE

## (undated) DEVICE — APPLICATOR PREP 26ML CHG 2% ISO ALC 70%

## (undated) DEVICE — SPHERE FOR THA NAVIGATION SYS

## (undated) DEVICE — ISLAND DRESSING 4IN X 10IN: Brand: SILVERLON ANTIMICROBIAL WOUND DRESSING

## (undated) DEVICE — FEE TECH INTELLIJOINT

## (undated) DEVICE — SHEET,DRAPE,70X100,STERILE: Brand: MEDLINE

## (undated) DEVICE — SHEET,DRAPE,53X77,STERILE: Brand: MEDLINE

## (undated) DEVICE — SCREW, FEMUR, G2: Brand: INTELLIJOINT HIP®

## (undated) DEVICE — DRAPE,U/ SHT,SPLIT,PLAS,STERIL: Brand: MEDLINE

## (undated) DEVICE — SKIN PREP TRAY 4 COMPARTM TRAY: Brand: MEDLINE INDUSTRIES, INC.

## (undated) DEVICE — 3M™ COBAN™ NL STERILE NON-LATEX SELF-ADHERENT WRAP, 2084S, 4 IN X 5 YD (10 CM X 4,5 M), 18 ROLLS/CASE: Brand: 3M™ COBAN™

## (undated) DEVICE — BLADE ELECTRODE: Brand: EDGE

## (undated) DEVICE — FEMORAL DISC, ELLIPSE: Brand: INTELLIJOINT HIP®

## (undated) NOTE — Clinical Note
Dear Sheldon,  I had the opportunity to see your patient Kerline Vizcaino for an EMG recently. I appreciate your confidence in me to care for your patients. Please feel free call me with any questions at [phone] or contact me through Epic.  Sincerely, Tae Simons MD Board Certified, Physical Medicine and Rehabilitation Specializing in Sports Medicine, Spine Medicine and Electrodiagnostic Medicine [facility]

## (undated) NOTE — ED AVS SNAPSHOT
Vicente Jackson   MRN: [id number]    Department:  [facility] Emergency Department   Date of Visit:  8/3/2019           Disclosure     Insurance plans vary and the physician(s) referred by the ER may not be covered by your plan.  Please contact your CARE PHYSICIAN AT ONCE OR RETURN IMMEDIATELY TO THE EMERGENCY DEPARTMENT. If you have been prescribed any medication(s), please fill your prescription right away and begin taking the medication(s) as directed.   If you believe that any of the medications